# Patient Record
Sex: MALE | Race: WHITE | NOT HISPANIC OR LATINO | Employment: FULL TIME | URBAN - METROPOLITAN AREA
[De-identification: names, ages, dates, MRNs, and addresses within clinical notes are randomized per-mention and may not be internally consistent; named-entity substitution may affect disease eponyms.]

---

## 2017-01-26 ENCOUNTER — TELEPHONE (OUTPATIENT)
Dept: ORTHOPEDICS | Facility: CLINIC | Age: 37
End: 2017-01-26

## 2017-01-26 NOTE — PROGRESS NOTES
[facility] [place]  [address], [address]  [place] [address]  [phone]  Dept: [phone]    PRE-OP EVALUATION:  Today's date: 2017    Steve Vo (: 1980) presents for pre-operative evaluation assessment as requested by Dr. Gandara.  He requires evaluation and anesthesia risk assessment prior to undergoing surgery/procedure for treatment of left knee pain.  Proposed procedure: Left knee arthroscopy    Date of Surgery/ Procedure: 2017  Time of Surgery/ Procedure: 10:30am  Hospital/Surgical Facility: [facility]  Fax number for surgical facility:   Primary Physician: Rowan Martini  Type of Anesthesia Anticipated: General     Patient has a Health Care Directive or Living Will:  NO    1. NO - Do you have a history of heart attack, stroke, stent, bypass or surgery on an artery in the head, neck, heart or legs?  2. NO - Do you ever have any pain or discomfort in your chest?  3. NO - Do you have a history of  Heart Failure?  4. NO - Are you troubled by shortness of breath when: walking on the level, up a slight hill or at night?  5. YES - DO YOU CURRENTLY HAVE A COLD, BRONCHITIS OR OTHER RESPIRATORY INFECTION? Dealing with bronchitis  6. YES - DO YOU HAVE A COUGH, SHORTNESS OF BREATH OR WHEEZING? Mild cough- dry, and occasional   7. NO - Do you sometimes get pains in the calves of your legs when you walk?  9. NO - DO YOU OR DOES ANYONE IN YOUR FAMILY HAVE A SERIOUS BLEEDING PROBLEM SUCH AS PROLONGED BLEEDING FOLLOWING SURGERIES OR CUTS?   9. YES - Do you or does anyone in your family have a serious bleeding problem such as prolonged bleeding following surgeries or cuts? Father DVT- smoking  10. NO - Have you ever had problems with anemia or been told to take iron pills?  11. NO - Have you had any abnormal blood loss such as black, tarry or bloody stools, or abnormal vaginal bleeding?  12. NO - Have you ever had a blood transfusion?  13. NO - Have you or any  of your relatives ever had problems with anesthesia?  14. YES - DO YOU HAVE SLEEP APNEA, EXCESSIVE SNORING OR DAYTIME DROWSINESS? snoring  15. NO - Do you have any prosthetic heart valves?  16. NO - Do you have prosthetic joints?  17. NO - Is there any chance that you may be pregnant?      HPI:                                                      Brief HPI related to upcoming procedure: HX of chondromalacia patella, and Meniscus tear right knee, needing surgical repair.   Current treatment for bronchitis.   Patient has current dry cough- mild. Mild nasal congestion and sore throat, without fever. Has been present since mid December, 2016. Symptoms stable. No exertional symptoms or CONDE.       See problem list for active medical problems.  Problems all longstanding and stable, except as noted/documented.  See ROS for pertinent symptoms related to these conditions.                                                                                                  .    MEDICAL HISTORY:                                                      Patient Active Problem List    Diagnosis Date Noted     Bronchitis 01/30/2017     Priority: Medium     Electronic cigarette use 01/30/2017     Priority: Medium     Snoring 01/30/2017     Priority: Medium     LLQ abdominal pain 01/30/2017     Priority: Medium     Complex tear of medial meniscus of left knee as current injury 10/27/2016     Priority: Medium     Chondromalacia, knee, left 10/27/2016     Priority: Medium     CARDIOVASCULAR SCREENING; LDL GOAL LESS THAN 160 02/21/2013     Priority: Medium      Past Medical History   Diagnosis Date     NO ACTIVE PROBLEMS      Past Surgical History   Procedure Laterality Date     Biopsy of skin lesion  1990     inner lip cyst     Appendectomy  03/12/14     Vasectomy  2014     Current Outpatient Prescriptions   Medication Sig Dispense Refill     loratadine (CLARITIN) 10 MG tablet Take 1 tablet (10 mg) by mouth daily 30 tablet 1     albuterol  "(ALBUTEROL) 108 (90 BASE) MCG/ACT Inhaler Inhale 2 puffs into the lungs 4 times daily as needed for shortness of breath / dyspnea or wheezing 2 Inhaler 1     azelastine (ASTELIN) 0.1 % spray Spray 1-2 sprays into both nostrils 2 times daily 1 Bottle 1     OTC products: no recent use of OTC ASA, NSAIDS or Steroids    Allergies   Allergen Reactions     Penicillins Rash      Latex Allergy: NO    Social History   Substance Use Topics     Smoking status: Current Every Day Smoker -- 0.50 packs/day for 15 years     Types: Other     Last Attempt to Quit: 03/20/2014     Smokeless tobacco: Never Used      Comment: ecig daily     Alcohol Use: 0.0 oz/week     0 Standard drinks or equivalent per week      Comment: socially     History   Drug Use No       REVIEW OF SYSTEMS:                                                    Constitutional, neuro, ENT, endocrine, pulmonary, cardiac, gastrointestinal, genitourinary, musculoskeletal, integument and psychiatric systems are negative, except as otherwise noted. Bowel movements daily, has occasional LLQ abdominal pain- self-limiting, possible diverticulosis. No nausea or vomiting, and PO is no affected.     EXAM:                                                    /78 mmHg  Pulse 80  Temp(Src) 98.7  F (37.1  C) (Temporal)  Resp 14  Ht 5' 11\" (1.803 m)  Wt 234 lb 8 oz (106.369 kg)  BMI 32.72 kg/m2    GENERAL APPEARANCE: healthy, alert and no distress     EYES: EOMI, - PERRL     HENT: ear canals and TM's normal and nose and mouth without ulcers or lesions, mild erythema in oropharynx, uvula is elongated.      NECK: no adenopathy, no asymmetry, masses, or scars and thyroid normal to palpation     RESP: lungs clear to auscultation - no rales, rhonchi or wheezes     CV: regular rates and rhythm, normal S1 S2, no S3 or S4 and no murmur, click or rub -     ABDOMEN:  soft, nontender, no HSM or masses and bowel sounds normal     MS: extremities normal- no gross deformities noted, no " evidence of inflammation in joints, FROM in all extremities.     SKIN: no suspicious lesions or rashes     NEURO: Normal strength and tone, sensory exam grossly normal, mentation intact and speech normal     PSYCH: mentation appears normal. and affect normal/bright    DIAGNOSTICS:                                                    Hemoglobin (indicated for history of anemia or procedure with significant blood loss such as tonsillectomy, major intraperitoneal surgery, vascular surgery, major spine surgery, total joint replacement)    Recent Labs   Lab Test 03/12/14 09/05/13   1027   HGB   --   14.7   PLT   --   308   POTASSIUM  4.0   --    CR  0.89   --         IMPRESSION:                                                    Reason for surgery/procedure: left knee meniscus tear ad chondromalacia patella  Diagnosis/reason for consult: pre-op clearance  Current bronchitis  Snoring  LLL abdominal pain- not current.     The proposed surgical procedure is considered INTERMEDIATE risk.    REVISED CARDIAC RISK INDEX  The patient has the following serious cardiovascular risks for perioperative complications such as (MI, PE, VFib and 3  AV Block):  No serious cardiac risks  INTERPRETATION: 0 risks: Class I (very low risk - 0.4% complication rate)    The patient has the following additional risks for perioperative complications:  No identified additional risks      ICD-10-CM    1. Preop general physical exam Z01.818 Hemoglobin   2. Complex tear of medial meniscus of left knee as current injury, subsequent encounter S83.232D Hemoglobin   3. Chondromalacia, knee, left M94.262 Hemoglobin   4. Bronchitis J40        RECOMMENDATIONS:                                                          --Patient is to take all scheduled medications on the day of surgery EXCEPT for modifications listed below.    Hospital staff advised to monitor for oxygen de-saturation, due to snoring history.     APPROVAL GIVEN to proceed with proposed  procedure, without further diagnostic evaluation    Pt. Is monitoring bronchitis symptoms. Currently mild and may proceed with surgery. If symptoms worsen or change, he is to contact us and the surgeon.     Pt. Advised to follow-up with PCP post-op regarding LLQ abdominal pain- sub- acute, eval. and colonoscopy recommendations.        Signed Electronically by: QUIN Terrell CNP    Copy of this evaluation report is provided to requesting physician.    [facility] Preop Guidelines

## 2017-01-30 ENCOUNTER — OFFICE VISIT (OUTPATIENT)
Dept: FAMILY MEDICINE | Facility: CLINIC | Age: 37
End: 2017-01-30
Payer: COMMERCIAL

## 2017-01-30 VITALS
HEIGHT: 71 IN | BODY MASS INDEX: 32.83 KG/M2 | WEIGHT: 234.5 LBS | SYSTOLIC BLOOD PRESSURE: 118 MMHG | RESPIRATION RATE: 14 BRPM | DIASTOLIC BLOOD PRESSURE: 78 MMHG | HEART RATE: 80 BPM | TEMPERATURE: 98.7 F

## 2017-01-30 DIAGNOSIS — J40 BRONCHITIS: ICD-10-CM

## 2017-01-30 DIAGNOSIS — S83.232D COMPLEX TEAR OF MEDIAL MENISCUS OF LEFT KNEE AS CURRENT INJURY, SUBSEQUENT ENCOUNTER: ICD-10-CM

## 2017-01-30 DIAGNOSIS — M94.262 CHONDROMALACIA, KNEE, LEFT: ICD-10-CM

## 2017-01-30 DIAGNOSIS — Z01.818 PREOP GENERAL PHYSICAL EXAM: Primary | ICD-10-CM

## 2017-01-30 PROBLEM — R10.32 LLQ ABDOMINAL PAIN: Status: ACTIVE | Noted: 2017-01-30

## 2017-01-30 PROBLEM — R06.83 SNORING: Status: ACTIVE | Noted: 2017-01-30

## 2017-01-30 PROBLEM — Z78.9 ELECTRONIC CIGARETTE USE: Status: ACTIVE | Noted: 2017-01-30

## 2017-01-30 LAB — HGB BLD-MCNC: 14.7 G/DL (ref 13.3–17.7)

## 2017-01-30 PROCEDURE — 99214 OFFICE O/P EST MOD 30 MIN: CPT | Performed by: NURSE PRACTITIONER

## 2017-01-30 PROCEDURE — 36415 COLL VENOUS BLD VENIPUNCTURE: CPT | Performed by: NURSE PRACTITIONER

## 2017-01-30 PROCEDURE — 85018 HEMOGLOBIN: CPT | Performed by: NURSE PRACTITIONER

## 2017-01-30 RX ORDER — ALBUTEROL SULFATE 90 UG/1
2 AEROSOL, METERED RESPIRATORY (INHALATION) 4 TIMES DAILY PRN
Qty: 2 INHALER | Refills: 1 | COMMUNITY
Start: 2017-01-30 | End: 2017-05-23

## 2017-01-30 RX ORDER — AZELASTINE 1 MG/ML
1-2 SPRAY, METERED NASAL 2 TIMES DAILY
Qty: 1 BOTTLE | Refills: 1 | COMMUNITY
Start: 2017-01-30 | End: 2017-05-23

## 2017-01-30 RX ORDER — LORATADINE 10 MG/1
10 TABLET ORAL DAILY
Qty: 30 TABLET | Refills: 1 | COMMUNITY
Start: 2017-01-30 | End: 2022-02-18 | Stop reason: ALTCHOICE

## 2017-01-30 ASSESSMENT — PAIN SCALES - GENERAL: PAINLEVEL: MILD PAIN (2)

## 2017-01-30 ASSESSMENT — ANXIETY QUESTIONNAIRES
7. FEELING AFRAID AS IF SOMETHING AWFUL MIGHT HAPPEN: NOT AT ALL
1. FEELING NERVOUS, ANXIOUS, OR ON EDGE: SEVERAL DAYS
6. BECOMING EASILY ANNOYED OR IRRITABLE: SEVERAL DAYS
IF YOU CHECKED OFF ANY PROBLEMS ON THIS QUESTIONNAIRE, HOW DIFFICULT HAVE THESE PROBLEMS MADE IT FOR YOU TO DO YOUR WORK, TAKE CARE OF THINGS AT HOME, OR GET ALONG WITH OTHER PEOPLE: NOT DIFFICULT AT ALL
GAD7 TOTAL SCORE: 4
3. WORRYING TOO MUCH ABOUT DIFFERENT THINGS: SEVERAL DAYS
5. BEING SO RESTLESS THAT IT IS HARD TO SIT STILL: NOT AT ALL
2. NOT BEING ABLE TO STOP OR CONTROL WORRYING: SEVERAL DAYS

## 2017-01-30 ASSESSMENT — PATIENT HEALTH QUESTIONNAIRE - PHQ9: 5. POOR APPETITE OR OVEREATING: NOT AT ALL

## 2017-01-30 NOTE — NURSING NOTE
"Chief Complaint   Patient presents with     Pre-Op Exam     left knee surgery     Panel Management     Flu shot, phq, jennifer, Urine drug screen       Initial /78 mmHg  Pulse 80  Temp(Src) 98.7  F (37.1  C) (Temporal)  Resp 14  Ht 5' 11\" (1.803 m)  Wt 234 lb 8 oz (106.369 kg)  BMI 32.72 kg/m2 Estimated body mass index is 32.72 kg/(m^2) as calculated from the following:    Height as of this encounter: 5' 11\" (1.803 m).    Weight as of this encounter: 234 lb 8 oz (106.369 kg).  BP completed using cuff size: SMA Litzy    "

## 2017-01-31 ASSESSMENT — ANXIETY QUESTIONNAIRES: GAD7 TOTAL SCORE: 4

## 2017-01-31 ASSESSMENT — PATIENT HEALTH QUESTIONNAIRE - PHQ9: SUM OF ALL RESPONSES TO PHQ QUESTIONS 1-9: 4

## 2017-02-03 ENCOUNTER — ANESTHESIA (OUTPATIENT)
Dept: SURGERY | Facility: AMBULATORY SURGERY CENTER | Age: 37
End: 2017-02-03
Payer: COMMERCIAL

## 2017-02-03 ENCOUNTER — ANESTHESIA EVENT (OUTPATIENT)
Dept: SURGERY | Facility: AMBULATORY SURGERY CENTER | Age: 37
End: 2017-02-03
Payer: COMMERCIAL

## 2017-02-03 ENCOUNTER — HOSPITAL ENCOUNTER (OUTPATIENT)
Facility: AMBULATORY SURGERY CENTER | Age: 37
Discharge: HOME | End: 2017-02-03
Attending: ORTHOPAEDIC SURGERY | Admitting: ORTHOPAEDIC SURGERY
Payer: COMMERCIAL

## 2017-02-03 VITALS
SYSTOLIC BLOOD PRESSURE: 118 MMHG | OXYGEN SATURATION: 97 % | RESPIRATION RATE: 20 BRPM | TEMPERATURE: 96.8 F | DIASTOLIC BLOOD PRESSURE: 75 MMHG

## 2017-02-03 DIAGNOSIS — S83.232D COMPLEX TEAR OF MEDIAL MENISCUS OF LEFT KNEE AS CURRENT INJURY, SUBSEQUENT ENCOUNTER: Primary | ICD-10-CM

## 2017-02-03 DIAGNOSIS — M94.262 CHONDROMALACIA, KNEE, LEFT: ICD-10-CM

## 2017-02-03 PROCEDURE — 29881 ARTHRS KNE SRG MNISECTMY M/L: CPT | Mod: LT | Performed by: ORTHOPAEDIC SURGERY

## 2017-02-03 PROCEDURE — G8916 PT W IV AB GIVEN ON TIME: HCPCS

## 2017-02-03 PROCEDURE — 29881 ARTHRS KNE SRG MNISECTMY M/L: CPT | Mod: LT

## 2017-02-03 PROCEDURE — G8907 PT DOC NO EVENTS ON DISCHARG: HCPCS

## 2017-02-03 RX ORDER — SODIUM CHLORIDE, SODIUM LACTATE, POTASSIUM CHLORIDE, CALCIUM CHLORIDE 600; 310; 30; 20 MG/100ML; MG/100ML; MG/100ML; MG/100ML
INJECTION, SOLUTION INTRAVENOUS CONTINUOUS
Status: DISCONTINUED | OUTPATIENT
Start: 2017-02-03 | End: 2017-02-03 | Stop reason: HOSPADM

## 2017-02-03 RX ORDER — CLINDAMYCIN PHOSPHATE 900 MG/50ML
900 INJECTION, SOLUTION INTRAVENOUS
Status: COMPLETED | OUTPATIENT
Start: 2017-02-03 | End: 2017-02-03

## 2017-02-03 RX ORDER — ONDANSETRON 2 MG/ML
4 INJECTION INTRAMUSCULAR; INTRAVENOUS EVERY 30 MIN PRN
Status: DISCONTINUED | OUTPATIENT
Start: 2017-02-03 | End: 2017-02-03 | Stop reason: HOSPADM

## 2017-02-03 RX ORDER — ONDANSETRON 4 MG/1
4 TABLET, ORALLY DISINTEGRATING ORAL EVERY 30 MIN PRN
Status: DISCONTINUED | OUTPATIENT
Start: 2017-02-03 | End: 2017-02-03 | Stop reason: HOSPADM

## 2017-02-03 RX ORDER — ACETAMINOPHEN 325 MG/1
975 TABLET ORAL ONCE
Status: COMPLETED | OUTPATIENT
Start: 2017-02-03 | End: 2017-02-03

## 2017-02-03 RX ORDER — CLINDAMYCIN PHOSPHATE 900 MG/50ML
900 INJECTION, SOLUTION INTRAVENOUS SEE ADMIN INSTRUCTIONS
Status: DISCONTINUED | OUTPATIENT
Start: 2017-02-03 | End: 2017-02-03 | Stop reason: HOSPADM

## 2017-02-03 RX ORDER — LIDOCAINE 40 MG/G
CREAM TOPICAL
Status: DISCONTINUED | OUTPATIENT
Start: 2017-02-03 | End: 2017-02-03 | Stop reason: HOSPADM

## 2017-02-03 RX ORDER — DEXAMETHASONE SODIUM PHOSPHATE 4 MG/ML
INJECTION, SOLUTION INTRA-ARTICULAR; INTRALESIONAL; INTRAMUSCULAR; INTRAVENOUS; SOFT TISSUE PRN
Status: DISCONTINUED | OUTPATIENT
Start: 2017-02-03 | End: 2017-02-03

## 2017-02-03 RX ORDER — KETOROLAC TROMETHAMINE 30 MG/ML
INJECTION, SOLUTION INTRAMUSCULAR; INTRAVENOUS PRN
Status: DISCONTINUED | OUTPATIENT
Start: 2017-02-03 | End: 2017-02-03

## 2017-02-03 RX ORDER — NALOXONE HYDROCHLORIDE 0.4 MG/ML
.1-.4 INJECTION, SOLUTION INTRAMUSCULAR; INTRAVENOUS; SUBCUTANEOUS
Status: DISCONTINUED | OUTPATIENT
Start: 2017-02-03 | End: 2017-02-03 | Stop reason: HOSPADM

## 2017-02-03 RX ORDER — ACETAMINOPHEN 325 MG/1
975 TABLET ORAL ONCE
Status: DISCONTINUED | OUTPATIENT
Start: 2017-02-03 | End: 2017-02-03 | Stop reason: HOSPADM

## 2017-02-03 RX ORDER — OXYCODONE AND ACETAMINOPHEN 5; 325 MG/1; MG/1
1-2 TABLET ORAL EVERY 4 HOURS PRN
Qty: 30 TABLET | Refills: 0 | Status: SHIPPED | OUTPATIENT
Start: 2017-02-03 | End: 2017-05-25

## 2017-02-03 RX ORDER — BUPIVACAINE HYDROCHLORIDE AND EPINEPHRINE 2.5; 5 MG/ML; UG/ML
INJECTION, SOLUTION INFILTRATION; PERINEURAL PRN
Status: DISCONTINUED | OUTPATIENT
Start: 2017-02-03 | End: 2017-02-03 | Stop reason: HOSPADM

## 2017-02-03 RX ORDER — FENTANYL CITRATE 50 UG/ML
25-50 INJECTION, SOLUTION INTRAMUSCULAR; INTRAVENOUS
Status: DISCONTINUED | OUTPATIENT
Start: 2017-02-03 | End: 2017-02-03 | Stop reason: HOSPADM

## 2017-02-03 RX ORDER — MEPERIDINE HYDROCHLORIDE 25 MG/ML
12.5 INJECTION INTRAMUSCULAR; INTRAVENOUS; SUBCUTANEOUS
Status: DISCONTINUED | OUTPATIENT
Start: 2017-02-03 | End: 2017-02-03 | Stop reason: HOSPADM

## 2017-02-03 RX ORDER — GABAPENTIN 300 MG/1
300 CAPSULE ORAL ONCE
Status: DISCONTINUED | OUTPATIENT
Start: 2017-02-03 | End: 2017-02-03 | Stop reason: HOSPADM

## 2017-02-03 RX ORDER — HYDROMORPHONE HYDROCHLORIDE 1 MG/ML
.3-.5 INJECTION, SOLUTION INTRAMUSCULAR; INTRAVENOUS; SUBCUTANEOUS EVERY 10 MIN PRN
Status: DISCONTINUED | OUTPATIENT
Start: 2017-02-03 | End: 2017-02-03 | Stop reason: HOSPADM

## 2017-02-03 RX ORDER — LIDOCAINE HYDROCHLORIDE 20 MG/ML
INJECTION, SOLUTION INFILTRATION; PERINEURAL PRN
Status: DISCONTINUED | OUTPATIENT
Start: 2017-02-03 | End: 2017-02-03

## 2017-02-03 RX ORDER — FENTANYL CITRATE 50 UG/ML
INJECTION, SOLUTION INTRAMUSCULAR; INTRAVENOUS PRN
Status: DISCONTINUED | OUTPATIENT
Start: 2017-02-03 | End: 2017-02-03

## 2017-02-03 RX ORDER — PROPOFOL 10 MG/ML
INJECTION, EMULSION INTRAVENOUS CONTINUOUS PRN
Status: DISCONTINUED | OUTPATIENT
Start: 2017-02-03 | End: 2017-02-03

## 2017-02-03 RX ORDER — SODIUM CHLORIDE, SODIUM LACTATE, POTASSIUM CHLORIDE, CALCIUM CHLORIDE 600; 310; 30; 20 MG/100ML; MG/100ML; MG/100ML; MG/100ML
INJECTION, SOLUTION INTRAVENOUS CONTINUOUS PRN
Status: DISCONTINUED | OUTPATIENT
Start: 2017-02-03 | End: 2017-02-03

## 2017-02-03 RX ORDER — ONDANSETRON 2 MG/ML
INJECTION INTRAMUSCULAR; INTRAVENOUS PRN
Status: DISCONTINUED | OUTPATIENT
Start: 2017-02-03 | End: 2017-02-03

## 2017-02-03 RX ORDER — GABAPENTIN 300 MG/1
300 CAPSULE ORAL ONCE
Status: COMPLETED | OUTPATIENT
Start: 2017-02-03 | End: 2017-02-03

## 2017-02-03 RX ORDER — PROPOFOL 10 MG/ML
INJECTION, EMULSION INTRAVENOUS PRN
Status: DISCONTINUED | OUTPATIENT
Start: 2017-02-03 | End: 2017-02-03

## 2017-02-03 RX ADMIN — FENTANYL CITRATE 50 MCG: 50 INJECTION, SOLUTION INTRAMUSCULAR; INTRAVENOUS at 11:29

## 2017-02-03 RX ADMIN — FENTANYL CITRATE 50 MCG: 50 INJECTION, SOLUTION INTRAMUSCULAR; INTRAVENOUS at 11:10

## 2017-02-03 RX ADMIN — ACETAMINOPHEN 975 MG: 325 TABLET ORAL at 10:19

## 2017-02-03 RX ADMIN — CLINDAMYCIN PHOSPHATE 900 MG: 900 INJECTION, SOLUTION INTRAVENOUS at 11:15

## 2017-02-03 RX ADMIN — KETOROLAC TROMETHAMINE 30 MG: 30 INJECTION, SOLUTION INTRAMUSCULAR; INTRAVENOUS at 11:16

## 2017-02-03 RX ADMIN — SODIUM CHLORIDE, SODIUM LACTATE, POTASSIUM CHLORIDE, CALCIUM CHLORIDE: 600; 310; 30; 20 INJECTION, SOLUTION INTRAVENOUS at 10:41

## 2017-02-03 RX ADMIN — ONDANSETRON 4 MG: 2 INJECTION INTRAMUSCULAR; INTRAVENOUS at 11:16

## 2017-02-03 RX ADMIN — LIDOCAINE HYDROCHLORIDE 40 MG: 20 INJECTION, SOLUTION INFILTRATION; PERINEURAL at 11:10

## 2017-02-03 RX ADMIN — PROPOFOL 200 MG: 10 INJECTION, EMULSION INTRAVENOUS at 11:10

## 2017-02-03 RX ADMIN — DEXAMETHASONE SODIUM PHOSPHATE 4 MG: 4 INJECTION, SOLUTION INTRA-ARTICULAR; INTRALESIONAL; INTRAMUSCULAR; INTRAVENOUS; SOFT TISSUE at 11:16

## 2017-02-03 RX ADMIN — GABAPENTIN 300 MG: 300 CAPSULE ORAL at 10:19

## 2017-02-03 RX ADMIN — PROPOFOL 200 MCG/KG/MIN: 10 INJECTION, EMULSION INTRAVENOUS at 11:10

## 2017-02-03 NOTE — ANESTHESIA PREPROCEDURE EVALUATION
Anesthesia Evaluation     .        ROS/MED HX    ENT/Pulmonary:  - neg pulmonary ROS     Neurologic:       Cardiovascular:  - neg cardiovascular ROS       METS/Exercise Tolerance:     Hematologic:         Musculoskeletal:         GI/Hepatic:         Renal/Genitourinary:         Endo:         Psychiatric:         Infectious Disease:         Malignancy:         Other:               Physical Exam  Normal systems: pulmonary and dental    Airway   Mallampati: II  TM distance: >3 FB  Neck ROM: full    Dental     Cardiovascular   Rhythm and rate: regular and normal      Pulmonary                     Anesthesia Plan      History & Physical Review  History and physical reviewed and following examination; no interval change.    ASA Status:  2 .    NPO Status:  > 8 hours    Plan for General and LMA with Intravenous and Propofol induction. Maintenance will be TIVA.    PONV prophylaxis:  Ondansetron (or other 5HT-3) and Dexamethasone or Solumedrol       Postoperative Care  Postoperative pain management:  IV analgesics and Multi-modal analgesia.      Consents  Anesthetic plan, risks, benefits and alternatives discussed with:  Patient..                          .

## 2017-02-03 NOTE — ANESTHESIA POSTPROCEDURE EVALUATION
Patient: Steve Hammondslin    Procedure(s):  Left knee arthroscopy, medial meniscectomy, general anesthesia - Wound Class: I-Clean    Diagnosis:left knee medial meniscus tear  Diagnosis Additional Information: No value filed.    Anesthesia Type:  General, LMA    Note:  Anesthesia Post Evaluation    Patient location during evaluation: PACU  Patient participation: Able to fully participate in evaluation  Level of consciousness: awake  Pain management: adequate  Airway patency: patent  Cardiovascular status: acceptable  Respiratory status: acceptable  Hydration status: balanced  PONV: none     Anesthetic complications: None          Last vitals:  Filed Vitals:    02/03/17 1200 02/03/17 1205 02/03/17 1210   BP: 102/59 104/72    Temp: 97  F (36.1  C)     Resp: 24 20 18   SpO2: 94% 98% 99%         Electronically Signed By: Amrit Clark MD  February 3, 2017  12:12 PM

## 2017-02-03 NOTE — ANESTHESIA CARE TRANSFER NOTE
Patient: Steve Tavo    Procedure(s):  Left knee arthroscopy, medial meniscectomy, general anesthesia - Wound Class: I-Clean    Diagnosis: left knee medial meniscus tear  Diagnosis Additional Information: No value filed.    Anesthesia Type:   General, LMA     Note:  Airway :Nasal Cannula  Patient transferred to:PACU        Vitals: (Last set prior to Anesthesia Care Transfer)    CRNA VITALS  2/3/2017 1128 - 2/3/2017 1203      2/3/2017             Pulse: 79    SpO2: 99 %    Resp Rate (observed): 14                Electronically Signed By: QUIN Li CRNA  February 3, 2017  12:03 PM

## 2017-02-03 NOTE — IP AVS SNAPSHOT
[facility]    [address] [place] [address]    Phone:  [phone]                                       After Visit Summary   2/3/2017    Steve Vo    MRN: [id number]           After Visit Summary Signature Page     I have received my discharge instructions, and my questions have been answered. I have discussed any challenges I see with this plan with the nurse or doctor.    ..........................................................................................................................................  Patient/Patient Representative Signature      ..........................................................................................................................................  Patient Representative Print Name and Relationship to Patient    ..................................................               ................................................  Date                                            Time    ..........................................................................................................................................  Reviewed by Signature/Title    ...................................................              ..............................................  Date                                                            Time

## 2017-02-03 NOTE — IP AVS SNAPSHOT
MRN:[id number]                      After Visit Summary   2/3/2017    Steve Vo    MRN: [id number]           Thank you!     Thank you for choosing [facility] for your care. Our goal is always to provide you with excellent care. Hearing back from our patients is one way we can continue to improve our services. Please take a few minutes to complete the written survey that you may receive in the mail after you visit with us. Thank you!        Patient Information     Date Of Birth          1980        About your hospital stay     You were admitted on:  February 3, 2017 [place]  [facility]    You were discharged on:  February 3, 2017       Who to Call     For medical emergencies, please call 911.  For non-urgent questions about your medical care, please call your primary care provider or clinic, [phone]  For questions related to your surgery, please call your surgery clinic        Attending Provider     Provider    Mandeep Gandara MD       Primary Care Provider Office Phone # Fax #    RowanQUIN Starks [facility] [phone]       [facility] [address] [address] [address]        After Care Instructions     Discharge Instructions       Review outpatient procedure discharge instructions with patient as directed by Provider  You may walk on the leg as soon as you wish.  Bend the knee as soon as you wish.    Most people use crutches for a couple days.  Leave initial dressing for 2 days.  Then you may remove dressing and shower.  Rewrap with ace wrap.  Leave steri-strips in place.  Weight bearing as tolerated.  Apply ice or cold packs intermittently as needed to relieve pain.   Take pain medications as much as needed, but as little as you can get away with.  They do cause constipation.  May use Aleve up to 4 tablets per day or ibuprofen up to 12 tablets per day if desired.  Aspirin - Take 1 tablet (325 mg) by mouth daily  for blood thinning for 2-3 weeks.  Most people take about a week off work.  Return to clinic 1 week after surgery.                  Your next 10 appointments already scheduled     2017  8:00 AM   Return Visit with Mandeep Gandara MD   [facility] [place] ([facility] [place])    [address]  [address]  [place] [address]   [phone]              Further instructions from your care team       [facility]  Same-Day Surgery   Adult Discharge Orders & Instructions   For 24 hours after surgery  1. Get plenty of rest.  A responsible adult must stay with you for at least 24 hours after you leave the hospital.   2. Do not drive or use heavy equipment.  If you have weakness or tingling, don't drive or use heavy equipment until this feeling goes away.  3. Do not drink alcohol.  4. Avoid strenuous or risky activities.  Ask for help when climbing stairs.   5. You may feel lightheaded.  IF so, sit for a few minutes before standing.  Have someone help you get up.   6. If you have nausea (feel sick to your stomach): Drink only clear liquids such as apple juice, ginger ale, broth or 7-Up.  Rest may also help.  Be sure to drink enough fluids.  Move to a regular diet as you feel able.  7. You may have a slight fever. Call the doctor if your fever is over 100 F (37.7 C) (taken under the tongue) or lasts longer than 24 hours.  8. You may have a dry mouth, a sore throat, muscle aches or trouble sleeping.  These should go away after 24 hours.  9. Do not make important or legal decisions.   Call your doctor for any of the followin.  Signs of infection (fever, growing tenderness at the surgery site, a large amount of drainage or bleeding, severe pain, foul-smelling drainage, redness, swelling).    2. It has been over 8 to 10 hours since surgery and you are still not able to urinate (pass water).    3.  Headache for over 24 hours.      To contact a doctor, call  [phone]___________________________       Tylenol was given at 10:20 am.    No Ibuprofen before 5:15 pm.      Pending Results     No orders found from 2/2/2017 to 2/4/2017.            Admission Information        Provider Department Dept Phone    2/3/2017 Mandeep Gandara MD Mg Preop/Phase [phone]      Your Vitals Were     Blood Pressure Temperature Respirations Pulse Oximetry          104/72 mmHg 97  F (36.1  C) (Temporal) 20 98%        MyChart Information     [facility] gives you secure access to your electronic health record. If you see a primary care provider, you can also send messages to your care team and make appointments. If you have questions, please call your primary care clinic.  If you do not have a primary care provider, please call [phone] and they will assist you.        Care EveryWhere ID     This is your Care EveryWhere ID. This could be used by other organizations to access your [facility] medical records  [id number]           Review of your medicines      START taking        Dose / Directions    oxyCODONE-acetaminophen 5-325 MG per tablet   Commonly known as:  PERCOCET   Used for:  Complex tear of medial meniscus of left knee as current injury, subsequent encounter, Chondromalacia, knee, left        Dose:  1-2 tablet   Take 1-2 tablets by mouth every 4 hours as needed for pain (moderate to severe)   Quantity:  30 tablet   Refills:  0         CONTINUE these medicines which have NOT CHANGED        Dose / Directions    albuterol 108 (90 BASE) MCG/ACT Inhaler   Generic drug:  albuterol        Dose:  2 puff   Inhale 2 puffs into the lungs 4 times daily as needed for shortness of breath / dyspnea or wheezing   Quantity:  2 Inhaler   Refills:  1       ASTELIN 0.1 % spray   Generic drug:  azelastine        Dose:  1-2 spray   Spray 1-2 sprays into both nostrils 2 times daily   Quantity:  1 Bottle   Refills:  1       CLARITIN 10 MG tablet   Generic drug:  loratadine        Dose:  10 mg    Take 1 tablet (10 mg) by mouth daily   Quantity:  30 tablet   Refills:  1       IBUPROFEN PO        Dose:  200 mg   Take 200 mg by mouth as needed for moderate pain   Refills:  0            Where to get your medicines      Some of these will need a paper prescription and others can be bought over the counter. Ask your nurse if you have questions.     Bring a paper prescription for each of these medications    - oxyCODONE-acetaminophen 5-325 MG per tablet             Protect others around you: Learn how to safely use, store and throw away your medicines at www.disposemymeds.org.             Medication List: This is a list of all your medications and when to take them. Check marks below indicate your daily home schedule. Keep this list as a reference.      Medications           Morning Afternoon Evening Bedtime As Needed    albuterol 108 (90 BASE) MCG/ACT Inhaler   Inhale 2 puffs into the lungs 4 times daily as needed for shortness of breath / dyspnea or wheezing   Generic drug:  albuterol                                ASTELIN 0.1 % spray   Spray 1-2 sprays into both nostrils 2 times daily   Generic drug:  azelastine                                CLARITIN 10 MG tablet   Take 1 tablet (10 mg) by mouth daily   Generic drug:  loratadine                                IBUPROFEN PO   Take 200 mg by mouth as needed for moderate pain                                oxyCODONE-acetaminophen 5-325 MG per tablet   Commonly known as:  PERCOCET   Take 1-2 tablets by mouth every 4 hours as needed for pain (moderate to severe)

## 2017-02-03 NOTE — BRIEF OP NOTE
PROCEDURE NOTE:    Pre-operative diagnosis: left knee medial meniscus tear   Post-operative diagnosis: Left knee medial meniscus tear   Procedure: Procedure(s):  Left knee arthroscopy with partial medial meniscectomy   Surgeon: Mandeep Gandara MD   Assistant(s): Venkatesh Hemphill PA-C   Estimated blood loss: Minimal   Specimens: None   Findings: Left knee medial meniscus tear   Anesthesia: General endotracheal anesthesia   Drains: None   Complications: None   Weight bearing status: Weight bearing as tolerated   Activity: Activity as tolerated  Patient may move about with assist as indicated or with supervision     Venkatesh Hemphill PA-C  Supervising physician: Mandeep Gandara MD  Dept. of Orthopedics  [facility]

## 2017-02-09 ENCOUNTER — OFFICE VISIT (OUTPATIENT)
Dept: ORTHOPEDICS | Facility: CLINIC | Age: 37
End: 2017-02-09
Payer: COMMERCIAL

## 2017-02-09 VITALS — WEIGHT: 232 LBS | BODY MASS INDEX: 32.48 KG/M2 | RESPIRATION RATE: 18 BRPM | HEIGHT: 71 IN

## 2017-02-09 DIAGNOSIS — S83.232D COMPLEX TEAR OF MEDIAL MENISCUS OF LEFT KNEE AS CURRENT INJURY, SUBSEQUENT ENCOUNTER: ICD-10-CM

## 2017-02-09 DIAGNOSIS — Z98.890 S/P ARTHROSCOPY OF LEFT KNEE: Primary | ICD-10-CM

## 2017-02-09 PROCEDURE — 99024 POSTOP FOLLOW-UP VISIT: CPT | Performed by: ORTHOPAEDIC SURGERY

## 2017-02-09 NOTE — PROGRESS NOTES
Follow-up left knee arthroscopy with partial medial meniscectomy on .   Wounds are healing well.    He has small effusion.   There is mild pain.      Assessment:  Left knee arthroscopy with partial medial meniscectomy  doing well.  I reviewed the operative findings.  Will start range of motion and strengthening.  Scar massage.  Advance activity as tolerated.  Pain medication refill:  None.  RTC 3-4 weeks if still painful.

## 2017-02-09 NOTE — PATIENT INSTRUCTIONS
After arthroscopy we want to get range of motion and strength back.  Riding a bicycle at low resistance is helpful for motion and strength.  Tubigrip or ace wrap will help decrease swelling.  .   Standing hamstring stretch: Put the heel of the leg on your injured side on a stool about 15 inches high. Keep your leg straight. Lean forward, bending at the hips, until you feel a mild stretch in the back of your thigh. Make sure you don't roll your shoulders or bend at the waist when doing this or you will stretch your lower back instead of your leg. Hold the stretch for 15 to 30 seconds. Repeat 3 times.   Quadriceps stretch: Stand an arm's length away from the wall with your injured leg farthest from the wall. Facing straight ahead, brace yourself by keeping one hand against the wall. With your other hand, grasp the ankle of your injured leg and pull your heel toward your buttocks. Don't arch or twist your back. Keep your knees together. Hold this stretch for 15 to 30 seconds.   Side-lying leg lift: Lie on your uninjured side. Tighten the front thigh muscles on your injured leg and lift that leg 8 to 10 inches away from the other leg. Keep the leg straight and lower it slowly. Do 3 sets of 10.   Quad sets: Sit on the floor with your injured leg straight and your other leg bent. Press the back of the knee of your injured leg against the floor by tightening the muscles on the top of your thigh. Hold this position 10 seconds. Relax. Do 3 sets of 10.   Straight leg raise: Lie on your back with your legs straight out in front of you. Bend the knee on your uninjured side and place the foot flat on the floor. Tighten the thigh muscle on your injured side and lift your leg about 8 inches off the floor. Keep your leg straight and your thigh muscle tight. Slowly lower your leg back down to the floor. Do 3 sets of 10.   Step-up: Stand with the foot of your injured leg on a support 3 to 5 inches high (like a small step or block of  wood). Keep your other foot flat on the floor. Shift your weight onto the injured leg on the support. Straighten your injured leg as the other leg comes off the floor. Return to the starting position by bending your injured leg and slowly lowering your uninjured leg back to the floor. Do 3 sets of 10.   Wall squat with a ball: Stand with your back, shoulders, and head against a wall. Look straight ahead. Keep your shoulders relaxed and your feet 3 feet from the wall and shoulder's width apart. Place a soccer or basketball-sized ball behind your back. Keeping your back against the wall, slowly squat down to a 45-degree angle. Your thighs will not yet be parallel to the floor. Hold this position for 10 seconds and then slowly slide back up the wall. Repeat 10 times. Build up to 3 sets of 10.   Knee stabilization: Wrap a piece of elastic tubing around the ankle of the uninjured leg. Tie a knot in the other end of the tubing and close it in a door.   0. Stand facing the door on the leg without tubing and bend your knee slightly, keeping your thigh muscles tight. While maintaining this position, move the leg with the tubing straight back behind you. Do 3 sets of 10.   0. Turn 90 degrees so the leg without tubing is closest to the door. Move the leg with tubing away from your body. Do 3 sets of 10.   0. Turn 90 degrees again so your back is to the door. Move the leg with tubing straight out in front of you. Do 3 sets of 10.   0. Turn your body 90 degrees again so the leg with tubing is closest to the door. Move the leg with tubing across your body. Do 3 sets of 10.   Hold onto a chair if you need help balancing. This exercise can be made even more challenging by standing on a pillow while you move the leg with tubing.   Resisted terminal knee extension: Make a loop from a piece of elastic tubing by tying a knot in both ends. Close both knots in a door. Step into the loop so the tubing is around the back of your injured  leg. Lift the other foot off the ground. Hold onto a chair for balance, if needed. Bend the knee on the leg with tubing about 45 degrees. Slowly straighten your leg, keeping your thigh muscle tight as you do this. Do this 10 times. Do 3 sets. An easier way to do this is to stand on both legs for better support while you do the exercise.   Standing calf stretch: Stand facing a wall with your hands on the wall at about eye level. Keep your injured leg back with your heel on the floor. Keep the other leg forward with the knee bent. Turn your back foot slightly inward (as if you were pigeon-toed). Slowly lean into the wall until you feel a stretch in the back of your calf. Hold the stretch for 15 to 30 seconds. Return to the starting position. Repeat 3 times. Do this exercise several times each day.   Clam exercise: Lie on your uninjured side with your hips and knees bent and feet together. Slowly raise your top leg toward the ceiling while keeping your heels touching each other. Hold for 2 seconds and lower slowly. Do 3 sets of 10 repetitions.   Iliotibial band stretch: Side-bending: Cross one leg in front of the other leg and lean in the opposite direction from the front leg. Reach the arm on the side of the back leg over your head while you do this. Hold this position for 15 to 30 seconds. Return to the starting position. Repeat 3 times and then switch legs and repeat the exercise.   Published by [employer].  This content is reviewed periodically and is subject to change as new health information becomes available. The information is intended to inform and educate and is not a replacement for medical evaluation, advice, diagnosis or treatment by a healthcare professional.   Written by Jayne Ellis, MS, PT, and Magda Daniel, PT, [facility], [facility], for [employer].   ? 2010 [facility] and/or its affiliates. All Rights Reserved.   Copyright   [employer] Systems 2011  Adult Health Advisor

## 2017-02-09 NOTE — MR AVS SNAPSHOT
After Visit Summary   2/9/2017    Steve Vo    MRN: [id number]           Patient Information     Date Of Birth          1980        Visit Information        Provider Department      2/9/2017 8:00 AM Mandeep Gandara MD [facility] Instructions    After arthroscopy we want to get range of motion and strength back.  Riding a bicycle at low resistance is helpful for motion and strength.  Tubigrip or ace wrap will help decrease swelling.  .   Standing hamstring stretch: Put the heel of the leg on your injured side on a stool about 15 inches high. Keep your leg straight. Lean forward, bending at the hips, until you feel a mild stretch in the back of your thigh. Make sure you don't roll your shoulders or bend at the waist when doing this or you will stretch your lower back instead of your leg. Hold the stretch for 15 to 30 seconds. Repeat 3 times.   Quadriceps stretch: Stand an arm's length away from the wall with your injured leg farthest from the wall. Facing straight ahead, brace yourself by keeping one hand against the wall. With your other hand, grasp the ankle of your injured leg and pull your heel toward your buttocks. Don't arch or twist your back. Keep your knees together. Hold this stretch for 15 to 30 seconds.   Side-lying leg lift: Lie on your uninjured side. Tighten the front thigh muscles on your injured leg and lift that leg 8 to 10 inches away from the other leg. Keep the leg straight and lower it slowly. Do 3 sets of 10.   Quad sets: Sit on the floor with your injured leg straight and your other leg bent. Press the back of the knee of your injured leg against the floor by tightening the muscles on the top of your thigh. Hold this position 10 seconds. Relax. Do 3 sets of 10.   Straight leg raise: Lie on your back with your legs straight out in front of you. Bend the knee on your uninjured side and place the foot flat on the floor. Tighten the thigh  muscle on your injured side and lift your leg about 8 inches off the floor. Keep your leg straight and your thigh muscle tight. Slowly lower your leg back down to the floor. Do 3 sets of 10.   Step-up: Stand with the foot of your injured leg on a support 3 to 5 inches high (like a small step or block of wood). Keep your other foot flat on the floor. Shift your weight onto the injured leg on the support. Straighten your injured leg as the other leg comes off the floor. Return to the starting position by bending your injured leg and slowly lowering your uninjured leg back to the floor. Do 3 sets of 10.   Wall squat with a ball: Stand with your back, shoulders, and head against a wall. Look straight ahead. Keep your shoulders relaxed and your feet 3 feet from the wall and shoulder's width apart. Place a soccer or basketball-sized ball behind your back. Keeping your back against the wall, slowly squat down to a 45-degree angle. Your thighs will not yet be parallel to the floor. Hold this position for 10 seconds and then slowly slide back up the wall. Repeat 10 times. Build up to 3 sets of 10.   Knee stabilization: Wrap a piece of elastic tubing around the ankle of the uninjured leg. Tie a knot in the other end of the tubing and close it in a door.   0. Stand facing the door on the leg without tubing and bend your knee slightly, keeping your thigh muscles tight. While maintaining this position, move the leg with the tubing straight back behind you. Do 3 sets of 10.   0. Turn 90 degrees so the leg without tubing is closest to the door. Move the leg with tubing away from your body. Do 3 sets of 10.   0. Turn 90 degrees again so your back is to the door. Move the leg with tubing straight out in front of you. Do 3 sets of 10.   0. Turn your body 90 degrees again so the leg with tubing is closest to the door. Move the leg with tubing across your body. Do 3 sets of 10.   Hold onto a chair if you need help balancing. This  exercise can be made even more challenging by standing on a pillow while you move the leg with tubing.   Resisted terminal knee extension: Make a loop from a piece of elastic tubing by tying a knot in both ends. Close both knots in a door. Step into the loop so the tubing is around the back of your injured leg. Lift the other foot off the ground. Hold onto a chair for balance, if needed. Bend the knee on the leg with tubing about 45 degrees. Slowly straighten your leg, keeping your thigh muscle tight as you do this. Do this 10 times. Do 3 sets. An easier way to do this is to stand on both legs for better support while you do the exercise.   Standing calf stretch: Stand facing a wall with your hands on the wall at about eye level. Keep your injured leg back with your heel on the floor. Keep the other leg forward with the knee bent. Turn your back foot slightly inward (as if you were pigeon-toed). Slowly lean into the wall until you feel a stretch in the back of your calf. Hold the stretch for 15 to 30 seconds. Return to the starting position. Repeat 3 times. Do this exercise several times each day.   Clam exercise: Lie on your uninjured side with your hips and knees bent and feet together. Slowly raise your top leg toward the ceiling while keeping your heels touching each other. Hold for 2 seconds and lower slowly. Do 3 sets of 10 repetitions.   Iliotibial band stretch: Side-bending: Cross one leg in front of the other leg and lean in the opposite direction from the front leg. Reach the arm on the side of the back leg over your head while you do this. Hold this position for 15 to 30 seconds. Return to the starting position. Repeat 3 times and then switch legs and repeat the exercise.   Published by [employer].  This content is reviewed periodically and is subject to change as new health information becomes available. The information is intended to inform and educate and is not a replacement for medical evaluation,  "advice, diagnosis or treatment by a healthcare professional.   Written by Jayne Ellis, MS, PT, and Magda Daniel, PT, [facility], [facility], for [facility].   ? 2010 [facility] and/or its affiliates. All Rights Reserved.   Copyright   Clinical Reference Systems 2011  Adult Health Advisor                             Follow-ups after your visit        Who to contact     If you have questions or need follow up information about today's clinic visit or your schedule please contact [facility] directly at [phone].  Normal or non-critical lab and imaging results will be communicated to you by [employer], letter or phone within 4 business days after the clinic has received the results. If you do not hear from us within 7 days, please contact the clinic through [employer] or phone. If you have a critical or abnormal lab result, we will notify you by phone as soon as possible.  Submit refill requests through [employer] or call your pharmacy and they will forward the refill request to us. Please allow 3 business days for your refill to be completed.          Additional Information About Your Visit        [employer] Information     [employer] gives you secure access to your electronic health record. If you see a primary care provider, you can also send messages to your care team and make appointments. If you have questions, please call your primary care clinic.  If you do not have a primary care provider, please call [phone] and they will assist you.        Care EveryWhere ID     This is your Care EveryWhere ID. This could be used by other organizations to access your [facility] medical records  [id number]        Your Vitals Were     Respirations Height BMI (Body Mass Index)             18 1.803 m (5' 11\") 32.37 kg/m2          Blood Pressure from Last 3 Encounters:   02/03/17 118/75   01/30/17 118/78   10/20/16 130/76    Weight from Last 3 Encounters:   02/09/17 105.235 kg (232 lb)   01/30/17 106.369 kg (234 lb 8 oz)   10/27/16 " 107.049 kg (236 lb)              Today, you had the following     No orders found for display       Primary Care Provider Office Phone # Fax #    QUIN Villegas [facility] [phone]       [facility] [address] [facility] [address]        Thank you!     Thank you for choosing [facility]  for your care. Our goal is always to provide you with excellent care. Hearing back from our patients is one way we can continue to improve our services. Please take a few minutes to complete the written survey that you may receive in the mail after your visit with us. Thank you!             Your Updated Medication List - Protect others around you: Learn how to safely use, store and throw away your medicines at www.disposemymeds.org.          This list is accurate as of: 2/9/17  8:03 AM.  Always use your most recent med list.                   Brand Name Dispense Instructions for use    albuterol 108 (90 BASE) MCG/ACT Inhaler   Generic drug:  albuterol     2 Inhaler    Inhale 2 puffs into the lungs 4 times daily as needed for shortness of breath / dyspnea or wheezing       ASTELIN 0.1 % spray   Generic drug:  azelastine     1 Bottle    Spray 1-2 sprays into both nostrils 2 times daily       CLARITIN 10 MG tablet   Generic drug:  loratadine     30 tablet    Take 1 tablet (10 mg) by mouth daily       IBUPROFEN PO      Take 200 mg by mouth as needed for moderate pain       oxyCODONE-acetaminophen 5-325 MG per tablet    PERCOCET    30 tablet    Take 1-2 tablets by mouth every 4 hours as needed for pain (moderate to severe)

## 2017-02-09 NOTE — NURSING NOTE
"Chief Complaint   Patient presents with     RECHECK     Left knee scope on 2/3/17.       Initial Resp 18  Ht 1.803 m (5' 11\")  Wt 105.235 kg (232 lb)  BMI 32.37 kg/m2 Estimated body mass index is 32.37 kg/(m^2) as calculated from the following:    Height as of this encounter: 1.803 m (5' 11\").    Weight as of this encounter: 105.235 kg (232 lb).  Medication Reconciliation: complete   Akila Leon MA      "

## 2017-03-09 ENCOUNTER — TELEPHONE (OUTPATIENT)
Dept: ORTHOPEDICS | Facility: CLINIC | Age: 37
End: 2017-03-09

## 2017-05-23 ENCOUNTER — OFFICE VISIT (OUTPATIENT)
Dept: FAMILY MEDICINE | Facility: CLINIC | Age: 37
End: 2017-05-23
Payer: COMMERCIAL

## 2017-05-23 VITALS
HEART RATE: 80 BPM | DIASTOLIC BLOOD PRESSURE: 72 MMHG | WEIGHT: 235.5 LBS | HEIGHT: 71 IN | TEMPERATURE: 97.9 F | BODY MASS INDEX: 32.97 KG/M2 | RESPIRATION RATE: 20 BRPM | SYSTOLIC BLOOD PRESSURE: 132 MMHG

## 2017-05-23 DIAGNOSIS — K62.5 RECTAL BLEEDING: Primary | ICD-10-CM

## 2017-05-23 PROCEDURE — 99214 OFFICE O/P EST MOD 30 MIN: CPT | Performed by: NURSE PRACTITIONER

## 2017-05-23 ASSESSMENT — PAIN SCALES - GENERAL: PAINLEVEL: NO PAIN (0)

## 2017-05-23 NOTE — MR AVS SNAPSHOT
After Visit Summary   5/23/2017    Steve Vo    MRN: [id number]           Patient Information     Date Of Birth          1980        Visit Information        Provider Department      5/23/2017 11:40 AM Rowan Martini APRN CNP [facility]        Today's Diagnoses     Rectal bleeding    -  1       Follow-ups after your visit        Additional Services     GASTROENTEROLOGY ADULT REF PROCEDURE ONLY       Last Lab Result: Creatinine (mg/dL)       Date                     Value                 03/12/2014               0.89             ----------  Body mass index is 33.15 kg/(m^2).      Patient will be contacted to schedule procedure.     Please be aware that coverage of these services is subject to the terms and limitations of your health insurance plan.  Call member services at your health plan with any benefit or coverage questions.  Any procedures must be performed at a [facility] facility OR coordinated by your clinic's referral office.    Please bring the following with you to your appointment:    (1) Any X-Rays, CTs or MRIs which have been performed.  Contact the facility where they were done to arrange for  prior to your scheduled appointment.    (2) List of current medications   (3) This referral request   (4) Any documents/labs given to you for this referral                  Your next 10 appointments already scheduled     Jun 12, 2017   Procedure with Venkatesh Billings MD   [facility] ([facility])    [address] [place] [id number] [phone]              Who to contact     If you have questions or need follow up information about today's clinic visit or your schedule please contact [facility] directly at [phone].  Normal or non-critical lab and imaging results will be communicated to you by MyChart, letter or phone within 4 business days after the clinic has received the results. If you do not hear  "from us within 7 days, please contact the clinic through [employer] or phone. If you have a critical or abnormal lab result, we will notify you by phone as soon as possible.  Submit refill requests through [employer] or call your pharmacy and they will forward the refill request to us. Please allow 3 business days for your refill to be completed.          Additional Information About Your Visit        [email or web address] Information     [employer] gives you secure access to your electronic health record. If you see a primary care provider, you can also send messages to your care team and make appointments. If you have questions, please call your primary care clinic.  If you do not have a primary care provider, please call [phone] and they will assist you.        Care EveryWhere ID     This is your Care EveryWhere ID. This could be used by other organizations to access your [facility] medical records  [id number]        Your Vitals Were     Pulse Temperature Respirations Height BMI (Body Mass Index)       80 97.9  F (36.6  C) (Oral) 20 5' 10.67\" (1.795 m) 33.15 kg/m2        Blood Pressure from Last 3 Encounters:   05/23/17 132/72   02/03/17 118/75   01/30/17 118/78    Weight from Last 3 Encounters:   05/23/17 235 lb 8 oz (106.8 kg)   02/09/17 232 lb (105.2 kg)   01/30/17 234 lb 8 oz (106.4 kg)              We Performed the Following     GASTROENTEROLOGY ADULT REF PROCEDURE ONLY          Today's Medication Changes          These changes are accurate as of: 5/23/17 11:59 PM.  If you have any questions, ask your nurse or doctor.               Stop taking these medicines if you haven't already. Please contact your care team if you have questions.     albuterol 108 (90 BASE) MCG/ACT Inhaler   Generic drug:  albuterol   Stopped by:  Rowan Martini APRN CNP           ASTELIN 0.1 % spray   Generic drug:  azelastine   Stopped by:  Rowan Martini APRN CNP           IBUPROFEN PO   Stopped by:  Rowan Martini APRN CNP           " oxyCODONE-acetaminophen 5-325 MG per tablet   Commonly known as:  PERCOCET   Stopped by:  Rowan Martini APRN CNP                    Primary Care Provider Office Phone # Fax #    QUIN Villegas [phone]       [facility] [address] [facility]  [place] [address]        Thank you!     Thank you for choosing [facility]  for your care. Our goal is always to provide you with excellent care. Hearing back from our patients is one way we can continue to improve our services. Please take a few minutes to complete the written survey that you may receive in the mail after your visit with us. Thank you!             Your Updated Medication List - Protect others around you: Learn how to safely use, store and throw away your medicines at www.disposemymeds.org.          This list is accurate as of: 5/23/17 11:59 PM.  Always use your most recent med list.                   Brand Name Dispense Instructions for use    CLARITIN 10 MG tablet   Generic drug:  loratadine     30 tablet    Take 10 mg by mouth daily Reported on 5/23/2017

## 2017-05-23 NOTE — PROGRESS NOTES
SUBJECTIVE:                                                    Steve Vo is a 36 year old male who presents to clinic today for the following health issues:      Concern - Blood In Stool     Onset: Ongoing for a while    Description:   Noticed more blood in the stool and toilet this time. Feels like he has had nothing but issues since he has his appendix out. Always feeling bloated    Intensity: mild    Progression of Symptoms:  worsening and intermittent    Accompanying Signs & Symptoms:  No cramping or abdominal pain       Previous history of similar problem:   Yes    Precipitating factors:   Worsened by: N/a**    Alleviating factors:  Improved by: n/a       Therapies Tried and outcome: Has tried medication in the past for diverticulitis.     Patient reports today to discuss bleeding in stool. Does have bowel movements 1-2 times daily. He has somewhat soft stool. Infrequent straining. His diet consists of eating out a lot. He relates he as a poor nutritional diet. Does report that he does have sometimes eat raw, leafy vegetables. Denies rectal pain. Intermittent bleeding. He did have an episode of bleeding yesterday. No bleeding between bowel movements.     Current everyday smoker and occasional alcohol use. No family history of colon cancer.       Problem list and histories reviewed & adjusted, as indicated.  Additional history: as documented    Patient Active Problem List   Diagnosis     CARDIOVASCULAR SCREENING; LDL GOAL LESS THAN 160     Complex tear of medial meniscus of left knee as current injury     Chondromalacia, knee, left     Bronchitis     Electronic cigarette use     Snoring     LLQ abdominal pain     S/P arthroscopy of left knee     Past Surgical History:   Procedure Laterality Date     APPENDECTOMY  03/12/14     ARTHROSCOPY KNEE Left 2/3/2017    Med meniscectomy, DML     BIOPSY OF SKIN LESION  1990    inner lip cyst     VASECTOMY  2014       Social History   Substance Use Topics     Smoking  status: Current Every Day Smoker     Packs/day: 0.50     Years: 15.00     Last attempt to quit: 3/20/2014     Smokeless tobacco: Never Used      Comment: ecig daily     Alcohol use 0.0 oz/week     0 Standard drinks or equivalent per week      Comment: socially     Family History   Problem Relation Age of Onset     HEART DISEASE Father      PVD     Emphysema Father      Asthma No family hx of      C.A.D. No family hx of      DIABETES No family hx of      Hypertension No family hx of      CEREBROVASCULAR DISEASE No family hx of      Breast Cancer No family hx of      Cancer - colorectal No family hx of      Prostate Cancer No family hx of      Alcohol/Drug No family hx of      Allergies No family hx of      Alzheimer Disease No family hx of      Anesthesia Reaction No family hx of      Blood Disease No family hx of      CANCER No family hx of      Connective Tissue Disorder No family hx of      Circulatory No family hx of      Cardiovascular No family hx of      Arthritis No family hx of      Congenital Anomalies No family hx of      Depression No family hx of      Endocrine Disease No family hx of      Eye Disorder No family hx of      GASTROINTESTINAL DISEASE No family hx of      Genitourinary Problems No family hx of      Gynecology No family hx of      Genetic Disorder No family hx of      Lipids No family hx of      Musculoskeletal Disorder No family hx of      Neurologic Disorder No family hx of      Obesity No family hx of      OSTEOPOROSIS No family hx of      Psychotic Disorder No family hx of      Hearing Loss No family hx of      Respiratory No family hx of      Thyroid Disease No family hx of            Reviewed and updated as needed this visit by clinical staff  Tobacco  Allergies  Med Hx  Surg Hx  Fam Hx  Soc Hx      Reviewed and updated as needed this visit by Provider        This document serves as a record of the services and decisions personally performed and made by QUIN Becerra CNP. It was  "created on his behalf by Alysia Segal, a trained medical scribe. The creation of this document is based the provider's statements to the medical scribe.    Scribe Alysia Segal 12:04 PM 5/23/2017      ROS:  Constitutional, HEENT, cardiovascular, pulmonary, gi and gu systems are negative, except as otherwise noted.    OBJECTIVE:                                                    /90  Pulse 80  Temp 97.9  F (36.6  C) (Oral)  Resp 20  Ht 1.795 m (5' 10.67\")  Wt 106.8 kg (235 lb 8 oz)  BMI 33.15 kg/m2  Body mass index is 33.15 kg/(m^2).  GENERAL APPEARANCE: healthy, alert and no distress  EYES: Eyes grossly normal to inspection, PERRL and conjunctivae and sclerae normal  RESP: lungs clear to auscultation - no rales, rhonchi or wheezes  CV: regular rates and rhythm, normal S1 S2, no S3 or S4 and no murmur, click or rub  ABDOMEN: soft, nontender, without hepatosplenomegaly or masses and bowel sounds normal  SKIN: no suspicious lesions or rashes  NEURO: Normal strength and tone, mentation intact and speech normal  PSYCH: mentation appears normal and affect normal/bright    Diagnostic Test Results:  none      ASSESSMENT/PLAN:                                                        ICD-10-CM    1. Rectal bleeding K62.5 GASTROENTEROLOGY ADULT REF PROCEDURE ONLY   Intermittent chronic issue. Nutrition likely playing largest role.   * Noted to patient that I want to hear that his stool is softer.  * Explained it could take up to 4-6 weeks of soft stool for rectum to fully heal.  * Scheduled colonoscopy for future.  * Consider Citricell or Benefiber as fiber supplement.  * Return to clinic with any new or worsening symptoms, and as needed.     The information in this document, created by a scribe for me, accurately reflects the services I personally performed and the decisions made by me. I have reviewed and approved this document for accuracy.      QUIN Terrell [facility]"

## 2017-06-12 ENCOUNTER — SURGERY (OUTPATIENT)
Age: 37
End: 2017-06-12

## 2017-06-12 ENCOUNTER — HOSPITAL ENCOUNTER (OUTPATIENT)
Facility: CLINIC | Age: 37
Discharge: HOME | End: 2017-06-12
Attending: INTERNAL MEDICINE | Admitting: INTERNAL MEDICINE
Payer: COMMERCIAL

## 2017-06-12 VITALS
TEMPERATURE: 98 F | SYSTOLIC BLOOD PRESSURE: 122 MMHG | DIASTOLIC BLOOD PRESSURE: 93 MMHG | OXYGEN SATURATION: 95 % | HEART RATE: 73 BPM | RESPIRATION RATE: 16 BRPM

## 2017-06-12 LAB — COLONOSCOPY: NORMAL

## 2017-06-12 PROCEDURE — 25000125 ZZHC RX 250: Performed by: INTERNAL MEDICINE

## 2017-06-12 PROCEDURE — 45385 COLONOSCOPY W/LESION REMOVAL: CPT | Performed by: INTERNAL MEDICINE

## 2017-06-12 PROCEDURE — 25000128 H RX IP 250 OP 636: Performed by: INTERNAL MEDICINE

## 2017-06-12 PROCEDURE — 88305 TISSUE EXAM BY PATHOLOGIST: CPT | Mod: 26 | Performed by: INTERNAL MEDICINE

## 2017-06-12 PROCEDURE — 40000296 ZZH STATISTIC ENDO RECOVERY CLASS 1:2 FIRST HOUR: Performed by: INTERNAL MEDICINE

## 2017-06-12 PROCEDURE — 45380 COLONOSCOPY AND BIOPSY: CPT | Performed by: INTERNAL MEDICINE

## 2017-06-12 PROCEDURE — 88305 TISSUE EXAM BY PATHOLOGIST: CPT | Performed by: INTERNAL MEDICINE

## 2017-06-12 RX ORDER — FENTANYL CITRATE 50 UG/ML
INJECTION, SOLUTION INTRAMUSCULAR; INTRAVENOUS PRN
Status: DISCONTINUED | OUTPATIENT
Start: 2017-06-12 | End: 2017-06-12 | Stop reason: HOSPADM

## 2017-06-12 RX ORDER — LIDOCAINE 40 MG/G
CREAM TOPICAL
Status: DISCONTINUED | OUTPATIENT
Start: 2017-06-12 | End: 2017-06-12 | Stop reason: HOSPADM

## 2017-06-12 RX ORDER — ONDANSETRON 2 MG/ML
4 INJECTION INTRAMUSCULAR; INTRAVENOUS
Status: DISCONTINUED | OUTPATIENT
Start: 2017-06-12 | End: 2017-06-12 | Stop reason: HOSPADM

## 2017-06-12 RX ADMIN — MIDAZOLAM HYDROCHLORIDE 2 MG: 1 INJECTION, SOLUTION INTRAMUSCULAR; INTRAVENOUS at 08:02

## 2017-06-12 RX ADMIN — MIDAZOLAM HYDROCHLORIDE 1 MG: 1 INJECTION, SOLUTION INTRAMUSCULAR; INTRAVENOUS at 08:04

## 2017-06-12 RX ADMIN — MIDAZOLAM HYDROCHLORIDE 1 MG: 1 INJECTION, SOLUTION INTRAMUSCULAR; INTRAVENOUS at 08:05

## 2017-06-12 RX ADMIN — FENTANYL CITRATE 25 MCG: 50 INJECTION, SOLUTION INTRAMUSCULAR; INTRAVENOUS at 08:10

## 2017-06-12 RX ADMIN — FENTANYL CITRATE 50 MCG: 50 INJECTION, SOLUTION INTRAMUSCULAR; INTRAVENOUS at 08:02

## 2017-06-12 RX ADMIN — MIDAZOLAM HYDROCHLORIDE 1 MG: 1 INJECTION, SOLUTION INTRAMUSCULAR; INTRAVENOUS at 08:03

## 2017-06-12 NOTE — CONSULTS
[facility] GI Pre-Procedure Physical Assessment    Steve Vo MRN# [id number]   Age: 36 year old YOB: 1980      Date of Surgery: 6/12/2017  Location [facility]      Date of Exam 6/12/2017 Facility (Same day)       Home clinic: [facility]  Primary care provider: Rowan Martini         Active problem list:   Patient Active Problem List   Diagnosis     CARDIOVASCULAR SCREENING; LDL GOAL LESS THAN 160     Complex tear of medial meniscus of left knee as current injury     Chondromalacia, knee, left     Bronchitis     Electronic cigarette use     Snoring     LLQ abdominal pain     S/P arthroscopy of left knee            Medications (include herbals and vitamins):   Any Plavix use in the last 7 days?  No     Current Facility-Administered Medications   Medication     lidocaine 1 % 1 mL     lidocaine (LMX4) kit     sodium chloride (PF) 0.9% PF flush 3 mL     sodium chloride (PF) 0.9% PF flush 3 mL     sodium chloride (PF) 0.9% PF flush 3 mL     ondansetron (ZOFRAN) injection 4 mg             Allergies:      Allergies   Allergen Reactions     Penicillins Rash     Allergy to Latex?  No  Allergy to tape?    No          Social History:     Social History   Substance Use Topics     Smoking status: Current Every Day Smoker     Packs/day: 0.50     Years: 15.00     Last attempt to quit: 3/20/2014     Smokeless tobacco: Never Used      Comment: ecig daily     Alcohol use 0.0 oz/week     0 Standard drinks or equivalent per week      Comment: socially            Physical Exam:   All vitals have been reviewed  Blood pressure 133/79, temperature 98  F (36.7  C), temperature source Oral, resp. rate 18, SpO2 98 %.  Airway assessment:   Patient is able to open mouth wide  Patient is able to stick out tongue      Lungs:   No increased work of breathing, good air exchange, clear to auscultation bilaterally, no crackles or wheezing      Cardiovascular:   Normal apical impulse, regular  rate and rhythm, normal S1 and S2, no S3 or S4, and no murmur noted           Lab / Radiology Results:   All laboratory data reviewed          Assessment:   Appropriately NPO  Chief complaint or anatomic assessment of involved area: diarrhea, blood in stool         Plan:   Moderate (conscious) sedation     Patient's active problems diagnostically and therapeutically optimized for the planned procedure  Risks, benefits, alternatives to sedation and blood explained and consent obtained  Risks, benefits, alternatives to procedure explained and consent obtained  Orders and progress notes are in the chart  Discharge from Phase 1 and / or Phase 2 recovery when patient meets criteria    I have reviewed the history and physical, lab finding(s), diagnostic data, medicaitons, and the plan for sedation.  I have determined this patient to be an appropriate candidate for the planned sedation / procedure and have reassessed the patient immediately prior to sedation / procedure.    I have personally and medically directed the administration of medications used.    Venkatesh Billings MD

## 2017-06-12 NOTE — IP AVS SNAPSHOT
[facility] Endoscopy    [address] [address]    Phone:  [phone]                                       After Visit Summary   6/12/2017    Steve Vo    MRN: [id number]           After Visit Summary Signature Page     I have received my discharge instructions, and my questions have been answered. I have discussed any challenges I see with this plan with the nurse or doctor.    ..........................................................................................................................................  Patient/Patient Representative Signature      ..........................................................................................................................................  Patient Representative Print Name and Relationship to Patient    ..................................................               ................................................  Date                                            Time    ..........................................................................................................................................  Reviewed by Signature/Title    ...................................................              ..............................................  Date                                                            Time

## 2017-06-12 NOTE — IP AVS SNAPSHOT
MRN:[id number]                      After Visit Summary   6/12/2017    Steve Vo    MRN: [id number]           Thank you!     Thank you for choosing [facility] for your care. Our goal is always to provide you with excellent care. Hearing back from our patients is one way we can continue to improve our services. Please take a few minutes to complete the written survey that you may receive in the mail after you visit with us. Thank you!        Patient Information     Date Of Birth          1980        About your hospital stay     You were admitted on:  June 12, 2017 You last received care in the:  [facility]    You were discharged on:  June 12, 2017       Who to Call     For medical emergencies, please call 911.  For non-urgent questions about your medical care, please call your primary care provider or clinic, [phone]  For questions related to your surgery, please call your surgery clinic        Attending Provider     Provider Specialty    Venkatesh Billings MD Gastroenterology       Primary Care Provider Office Phone # Fax #    Rowan QUIN Rivas [facility] [phone]      Pending Results     No orders found from 6/10/2017 to 6/13/2017.            Admission Information     Date & Time Provider Department Dept. Phone    6/12/2017 Venkatesh Billings MD [facility] Endoscopy [phone]      Your Vitals Were     Blood Pressure Pulse Temperature Respirations Pulse Oximetry       112/77 70 98  F (36.7  C) (Oral) 16 95%       MyChart Information     MyChart gives you secure access to your electronic health record. If you see a primary care provider, you can also send messages to your care team and make appointments. If you have questions, please call your primary care clinic.  If you do not have a primary care provider, please call [phone] and they will assist you.        Care EveryWhere ID     This is your Care EveryWhere ID. This could be used by  other organizations to access your [facility] medical records  [id number]           Review of your medicines      CONTINUE these medicines which have NOT CHANGED        Dose / Directions    CLARITIN 10 MG tablet   Generic drug:  loratadine        Dose:  10 mg   Take 10 mg by mouth daily Reported on 5/23/2017   Quantity:  30 tablet   Refills:  1                Protect others around you: Learn how to safely use, store and throw away your medicines at www.disposemymeds.org.             Medication List: This is a list of all your medications and when to take them. Check marks below indicate your daily home schedule. Keep this list as a reference.      Medications           Morning Afternoon Evening Bedtime As Needed    CLARITIN 10 MG tablet   Take 10 mg by mouth daily Reported on 5/23/2017   Generic drug:  loratadine

## 2017-06-14 LAB — COPATH REPORT: NORMAL

## 2018-08-30 ENCOUNTER — VIRTUAL VISIT (OUTPATIENT)
Dept: FAMILY MEDICINE | Facility: CLINIC | Age: 38
End: 2018-08-30
Payer: COMMERCIAL

## 2018-08-30 DIAGNOSIS — H10.32 ACUTE BACTERIAL CONJUNCTIVITIS OF LEFT EYE: Primary | ICD-10-CM

## 2018-08-30 PROCEDURE — 99441 ZZC PHYSICIAN TELEPHONE EVALUATION 5-10 MIN: CPT | Performed by: NURSE PRACTITIONER

## 2018-08-30 RX ORDER — TOBRAMYCIN 3 MG/ML
1-2 SOLUTION/ DROPS OPHTHALMIC 4 TIMES DAILY
Qty: 1 BOTTLE | Refills: 0 | Status: SHIPPED | OUTPATIENT
Start: 2018-08-30 | End: 2020-10-05

## 2018-08-30 NOTE — PROGRESS NOTES
"Steve Vo is a 37 year old male who is being evaluated via a telephone visit.      The patient has been notified of following:     \"This telephone visit will be conducted via a call between you and your physician/provider. We have found that certain health care needs can be provided without the need for a physical exam.  This service lets us provide the care you need with a short phone conversation.  If a prescription is necessary we can send it directly to your pharmacy.  If lab work is needed we can place an order for that and you can then stop by our lab to have the test done at a later time.    We will bill your insurance company for this service.  Please check with your medical insurance if this type of visit is covered. You may be responsible for the cost of this type of visit if insurance coverage is denied.  The typical cost is $30 (10min), $59 (11-20min) and $85 (21-30min).  Most often these visits are shorter than 10 minutes.    If during the course of the call the physician/provider feels a telephone visit is not appropriate, you will not be charged for this service.\"       Consent has been obtained for this service by care team member: yes.   See the scanned image in the medical record.    Steve Vo complains of  Conjunctivitis      I have reviewed and updated the patient's Past Medical History, Social History, Family History and Medication List.    ALLERGIES  Penicillins    Lucía HINOJOSA CMA ([place])   (MA signature)    Preferred number to be called back at is [phone]    Additional provider notes: Left eye.  Awakening up crusty-   Focus changes  Itching,  drainage during the day  Clear.  wearing contacts- no  Wears glasses  No foreign body  Lid swelling- no  Mild puffiness of lower lid   no lid redness  Sclera injection- mild  URI symptoms- mid nasal congestion, maybe allergies   Light sensitivity-mild        Assessment/Plan:  Acute bacterial conjunctivitis of left eye       Pink eye cares. " Warning signs discussed Advised PE.   I have reviewed the note as documented above.  This accurately captures the substance of my conversation with the patient.    Total time of call between patient and provider was 6 minutes

## 2018-08-30 NOTE — MR AVS SNAPSHOT
After Visit Summary   8/30/2018    Steve Vo    MRN: [id number]           Patient Information     Date Of Birth          1980        Visit Information        Provider Department      8/30/2018 12:20 PM Rowan Martini APRN CNP [facility]        Today's Diagnoses     Acute bacterial conjunctivitis of left eye    -  1       Follow-ups after your visit        Who to contact     If you have questions or need follow up information about today's clinic visit or your schedule please contact [facility] directly at [phone].  Normal or non-critical lab and imaging results will be communicated to you by [employer], letter or phone within 4 business days after the clinic has received the results. If you do not hear from us within 7 days, please contact the clinic through [employer] or phone. If you have a critical or abnormal lab result, we will notify you by phone as soon as possible.  Submit refill requests through [employer] or call your pharmacy and they will forward the refill request to us. Please allow 3 business days for your refill to be completed.          Additional Information About Your Visit        MyChart Information     [employer] gives you secure access to your electronic health record. If you see a primary care provider, you can also send messages to your care team and make appointments. If you have questions, please call your primary care clinic.  If you do not have a primary care provider, please call [phone] and they will assist you.        Care EveryWhere ID     This is your Care EveryWhere ID. This could be used by other organizations to access your [facility] medical records  [id number]         Blood Pressure from Last 3 Encounters:   06/12/17 (!) 122/93   05/23/17 132/72   02/03/17 118/75    Weight from Last 3 Encounters:   05/23/17 235 lb 8 oz (106.8 kg)   02/09/17 232 lb (105.2 kg)   01/30/17 234 lb 8 oz (106.4 kg)              Today, you had the  following     No orders found for display         Today's Medication Changes          These changes are accurate as of 8/30/18  2:48 PM.  If you have any questions, ask your nurse or doctor.               Start taking these medicines.        Dose/Directions    tobramycin 0.3 % ophthalmic solution   Commonly known as:  TOBREX   Used for:  Acute bacterial conjunctivitis of left eye        Dose:  1-2 drop   Place 1-2 drops Into the left eye 4 times daily   Quantity:  1 Bottle   Refills:  0            Where to get your medicines      These medications were sent to [facility] - [place], [place] - [address]  [address], [place] [address]    Hours:  test fax sent successfully 1/20/04  kr Phone:  [phone]     tobramycin 0.3 % ophthalmic solution                Primary Care Provider Office Phone # Fax #    Rowan WILFREDO Martini APRN [facility] [phone] [address]  [place] [address]        Equal Access to Services     [facility] AH: Hadii aad ku hadasho Soomaali, waaxda luqadaha, qaybta kaalmada adeegyada, waxay idiin hayaan adeeg kharash la'roxy . So [facility] [phone].    ATENCIÓN: Si habla español, tiene a carranza disposición servicios gratuitos de asistencia lingüística. [facility] [phone].    We comply with applicable federal civil rights laws and [place] laws. We do not discriminate on the basis of race, color, national origin, age, disability, sex, sexual orientation, or gender identity.            Thank you!     [facility]. Our goal is always to provide you with excellent care. Hearing back from our patients is one way we can continue to improve our services. Please take a few minutes to complete the written survey that you may receive in the mail after your visit with us. Thank you!             Your Updated Medication List - Protect others around you: Learn how to safely use, store and throw  away your medicines at www.disposemymeds.org.          This list is accurate as of 8/30/18  2:48 PM.  Always use your most recent med list.                   Brand Name Dispense Instructions for use Diagnosis    CLARITIN 10 MG tablet   Generic drug:  loratadine     30 tablet    Take 10 mg by mouth daily Reported on 5/23/2017        tobramycin 0.3 % ophthalmic solution    TOBREX    1 Bottle    Place 1-2 drops Into the left eye 4 times daily    Acute bacterial conjunctivitis of left eye

## 2020-02-23 ENCOUNTER — HEALTH MAINTENANCE LETTER (OUTPATIENT)
Age: 40
End: 2020-02-23

## 2020-09-10 NOTE — PROGRESS NOTES
SUBJECTIVE:   CC: Steve Vo is an 39 year old male who presents for preventative health visit.     Healthy Habits:     Getting at least 3 servings of Calcium per day:  Yes    Bi-annual eye exam:  Yes    Dental care twice a year:  Yes    Sleep apnea or symptoms of sleep apnea:  None    Diet:  Regular (no restrictions)    Frequency of exercise:  1 day/week    Duration of exercise:  15-30 minutes    Taking medications regularly:  Yes    Barriers to taking medications:  None    Medication side effects:  Not applicable    PHQ-2 Total Score: 0    Additional concerns today:  Yes (lump and urine freguancy )    Concern - lump in groin area and urine frequency   Onset: lump=1 month urine=last 3 days     Description:   Lump located left lateral shaft of the penis    Progression of Symptoms:  Stable or worsening    Previous history of similar problem:   none    Precipitating factors:   Worsened by: none    Alleviating factors:  Improved by: none     Therapies Tried and outcome: none    Patient reports noticing a lump on the left lateral aspect of his penis.  Initially it was the size of a right screen; however, now it is just under a centimeter.  Has noticed it has waxed and waned in size.  He has not noticed any drainage.  Is not painful.  Has not noticed any overlying rash.  No other lesions noted.    Additionally over the last 3 days he has had issues following urination.  He states he can start a stream no problem he has good force.  He is does feel he is completely able to empty.  He does have some dribbling at the end of urinating however, which is new for him.    He also has history of tubular adenoma on colonoscopy.  He is due for repeat colonoscopy.    He is interested in smoking cessation.  He has tried Chantix in the past.  He worked well for him other than the vivid dreams.  He would like to try this again.  He states nicotine replacement options did not work well for him in the past.    He does have family  history of diabetes and like screening for this today given he is overweight.    He is okay getting his flu shot as well as a pneumonia shot given his smoking status.    Today's PHQ-2 Score:   PHQ-2 (  [employer]) 2020   Q1: Little interest or pleasure in doing things 0   Q2: Feeling down, depressed or hopeless 0   PHQ-2 Score 0   Q1: Little interest or pleasure in doing things Not at all   Q2: Feeling down, depressed or hopeless Not at all   PHQ-2 Score 0     Abuse: Current or Past(Physical, Sexual or Emotional)- No  Do you feel safe in your environment? Yes    Social History     Tobacco Use     Smoking status: Current Every Day Smoker     Packs/day: 0.50     Years: 15.00     Pack years: 7.50     Types: Cigarettes     Last attempt to quit: 3/20/2014     Years since quittin.4     Smokeless tobacco: Never Used     Tobacco comment: <1 ppd   Substance Use Topics     Alcohol use: Yes     Alcohol/week: 0.0 standard drinks     Comment: socially     If you drink alcohol do you typically have >3 drinks per day or >7 drinks per week? Yes    Alcohol Use 2020   Prescreen: >3 drinks/day or >7 drinks/week? No   Prescreen: >3 drinks/day or >7 drinks/week? -   AUDIT SCORE  4     AUDIT - Alcohol Use Disorders Identification Test - Reproduced from the World Health Organization Audit 2001 (Second Edition) 2020   1.  How often do you have a drink containing alcohol? 2 to 3 times a week   2.  How many drinks containing alcohol do you have on a typical day when you are drinking? 1 or 2   3.  How often do you have five or more drinks on one occasion? Less than monthly   4.  How often during the last year have you found that you were not able to stop drinking once you had started? Never   5.  How often during the last year have you failed to do what was normally expected of you because of drinking? Never   6.  How often during the last year have you needed a first drink in the morning to get yourself going after a heavy  drinking session? Never   7.  How often during the last year have you had a feeling of guilt or remorse after drinking? Never   8.  How often during the last year have you been unable to remember what happened the night before because of your drinking? Never   9.  Have you or someone else been injured because of your drinking? No   10. Has a relative, friend, doctor or other health care worker been concerned about your drinking or suggested you cut down? No   TOTAL SCORE 4     Last PSA: No results found for: PSA    Reviewed orders with patient. Reviewed health maintenance and updated orders accordingly - Yes  BP Readings from Last 3 Encounters:   20 136/86   17 (!) 122/93   17 132/72    Wt Readings from Last 3 Encounters:   20 107.7 kg (237 lb 8 oz)   17 106.8 kg (235 lb 8 oz)   17 105.2 kg (232 lb)                  Patient Active Problem List   Diagnosis     CARDIOVASCULAR SCREENING; LDL GOAL LESS THAN 160     Complex tear of medial meniscus of left knee as current injury     Chondromalacia, knee, left     Bronchitis     Electronic cigarette use     Snoring     LLQ abdominal pain     S/P arthroscopy of left knee     Past Surgical History:   Procedure Laterality Date     APPENDECTOMY  14     ARTHROSCOPY KNEE Left 2/3/2017    Med meniscectomy, DML     BIOPSY OF SKIN LESION      inner lip cyst     COLONOSCOPY N/A 2017    Procedure: COMBINED COLONOSCOPY, SINGLE OR MULTIPLE BIOPSY/POLYPECTOMY BY BIOPSY;  COLONOSCOPY with biopsy and polypectomy by snare.;  Surgeon: Venkatesh Billings MD;  Location:  GI     VASECTOMY         Social History     Tobacco Use     Smoking status: Current Every Day Smoker     Packs/day: 0.50     Years: 15.00     Pack years: 7.50     Types: Cigarettes     Last attempt to quit: 3/20/2014     Years since quittin.4     Smokeless tobacco: Never Used     Tobacco comment: <1 ppd   Substance Use Topics     Alcohol use: Yes     Alcohol/week:  0.0 standard drinks     Comment: socially     Family History   Problem Relation Age of Onset     Heart Disease Father         PVD     Emphysema Father      Diabetes Mother      Asthma No family hx of      C.A.D. No family hx of      Hypertension No family hx of      Cerebrovascular Disease No family hx of      Breast Cancer No family hx of      Cancer - colorectal No family hx of      Prostate Cancer No family hx of      Alcohol/Drug No family hx of      Allergies No family hx of      Alzheimer Disease No family hx of      Anesthesia Reaction No family hx of      Blood Disease No family hx of      Cancer No family hx of      Connective Tissue Disorder No family hx of      Circulatory No family hx of      Cardiovascular No family hx of      Arthritis No family hx of      Congenital Anomalies No family hx of      Depression No family hx of      Endocrine Disease No family hx of      Eye Disorder No family hx of      Gastrointestinal Disease No family hx of      Genitourinary Problems No family hx of      Gynecology No family hx of      Genetic Disorder No family hx of      Lipids No family hx of      Musculoskeletal Disorder No family hx of      Neurologic Disorder No family hx of      Obesity No family hx of      Osteoporosis No family hx of      Psychotic Disorder No family hx of      Hearing Loss No family hx of      Respiratory No family hx of      Thyroid Disease No family hx of          Current Outpatient Medications   Medication Sig Dispense Refill     Cetirizine HCl (ZYRTEC PO)        varenicline (CHANTIX ROYAL) 0.5 MG X 11 & 1 MG X 42 tablet Take 0.5 mg tab daily for 3 days, THEN 0.5 mg tab twice daily for 4 days, THEN 1 mg twice daily. 53 tablet 0     loratadine (CLARITIN) 10 MG tablet Take 10 mg by mouth daily Reported on 5/23/2017 30 tablet 1     tobramycin (TOBREX) 0.3 % ophthalmic solution Place 1-2 drops Into the left eye 4 times daily (Patient not taking: Reported on 9/11/2020) 1 Bottle 0     Allergies  "  Allergen Reactions     Penicillins Rash       Reviewed and updated as needed this visit by clinical staff  Tobacco  Allergies  Meds  Problems  Med Hx  Surg Hx  Fam Hx  Soc Hx          Reviewed and updated as needed this visit by Provider  Tobacco  Allergies  Meds  Problems  Med Hx  Surg Hx  Fam Hx  Soc Hx         Past Medical History:   Diagnosis Date     Chondromalacia, knee, left      NO ACTIVE PROBLEMS      Tubular adenoma     10 mm      Past Surgical History:   Procedure Laterality Date     APPENDECTOMY  03/12/14     ARTHROSCOPY KNEE Left 2/3/2017    Med meniscectomy, DML     BIOPSY OF SKIN LESION  1990    inner lip cyst     COLONOSCOPY N/A 6/12/2017    Procedure: COMBINED COLONOSCOPY, SINGLE OR MULTIPLE BIOPSY/POLYPECTOMY BY BIOPSY;  COLONOSCOPY with biopsy and polypectomy by snare.;  Surgeon: Venkatesh Billings MD;  Location:  GI     VASECTOMY  2014     Review of Systems   Constitutional: Negative for chills and fever.   HENT: Positive for congestion. Negative for ear pain.    Eyes: Negative for pain.   Respiratory: Negative for cough.    Cardiovascular: Negative for chest pain.   Gastrointestinal: Positive for abdominal pain. Negative for constipation, diarrhea and hematochezia.   Genitourinary: Positive for frequency. Negative for hematuria.   Neurological: Negative for dizziness.   Psychiatric/Behavioral: The patient is not nervous/anxious.      OBJECTIVE:   /86 (BP Location: Left arm, Patient Position: Sitting, Cuff Size: Adult Large)   Pulse 74   Temp 97.8  F (36.6  C) (Temporal)   Resp 16   Ht 1.798 m (5' 10.79\")   Wt 107.7 kg (237 lb 8 oz)   SpO2 97%   BMI 33.32 kg/m      Physical Exam  GENERAL: Obese male.  Healthy, alert and no distress  EYES: Eyes grossly normal to inspection, PERRL and conjunctivae and sclerae normal  HENT: ear canals and TM's normal, nose and mouth without ulcers or lesions  NECK: no adenopathy, no asymmetry, masses, or scars and thyroid normal to " palpation  RESP: lungs clear to auscultation - no rales, rhonchi or wheezes  CV: regular rate and rhythm, normal S1 S2, no S3 or S4, no murmur, click or rub, no peripheral edema and peripheral pulses strong  ABDOMEN: soft, nontender, no hepatosplenomegaly, no masses and bowel sounds normal  MS: no gross musculoskeletal defects noted, no edema  SKIN: no suspicious lesions or rashes  NEURO: Normal strength and tone, mentation intact and speech normal  PSYCH: mentation appears normal, affect normal/bright  : Left firm mobile nodule in the left lateral aspect of the penile shaft without overlying skin changes or rash.    Diagnostic Test Results:  Labs pending, see orders    ASSESSMENT/PLAN:   1. Routine general medical examination at a health care facility: Flu shot pneumococcal shots given today.  Discussed personal health and safety.  Due for routine lab work.  Interested in quitting smoking and started on Chantix.  Due for follow-up colonoscopy.  Acute issues noted and referred to urology.  - INFLUENZA VACCINE IM > 6 MONTHS VALENT IIV4 [65183]  - Lipid panel reflex to direct LDL Fasting  - Pneumococcal vaccine 23 valent PPSV23  (Pneumovax) [[id number]]  - ADMIN: Vaccine, Initial (26419)  - EA ADD'L VACCINE  - Basic metabolic panel  (Ca, Cl, CO2, Creat, Gluc, K, Na, BUN)  - Hemoglobin A1c    2. Penile mass: Recommend referral to urology for further evaluation and consideration of removal.  Given it waxes and wanes in size this likely represents a cyst.  Can also discuss urinary dribbling at that time.  - UROLOGY ADULT REFERRAL; Future    3. Tubular adenoma of colon: Due for follow-up colonoscopy.  - GASTROENTEROLOGY ADULT REF PROCEDURE ONLY; Future    4. Encounter for tobacco use cessation counselin minutes spent discussing smoking cessation options.  - varenicline (CHANTIX ROYAL) 0.5 MG X 11 & 1 MG X 42 tablet; Take 0.5 mg tab daily for 3 days, THEN 0.5 mg tab twice daily for 4 days, THEN 1 mg twice daily.  Dispense:  "53 tablet; Refill: 0  - SMOKING CESSATION COUNSELING, 3-10 MIN    5. Needs flu shot  - INFLUENZA VACCINE IM > 6 MONTHS VALENT IIV4 [46625]  - EA ADD'L VACCINE    6. Need for 23-polyvalent pneumococcal polysaccharide vaccine  - Pneumococcal vaccine 23 valent PPSV23  (Pneumovax) [[id number]]  - ADMIN: Vaccine, Initial (88325)    7. Screening cholesterol level  - Lipid panel reflex to direct LDL Fasting    8. Screening for diabetes mellitus  - Basic metabolic panel  (Ca, Cl, CO2, Creat, Gluc, K, Na, BUN)  - Hemoglobin A1c    9. Screening for thyroid disorder  - TSH with free T4 reflex      COUNSELING:   Reviewed preventive health counseling, as reflected in patient instructions       Regular exercise       Healthy diet/nutrition       Vision screening       Hearing screening       Immunizations    Vaccinated for: Influenza and Pneumococcal         Colon cancer screening       One time pneumovax for smokers    Estimated body mass index is 33.32 kg/m  as calculated from the following:    Height as of this encounter: 1.798 m (5' 10.79\").    Weight as of this encounter: 107.7 kg (237 lb 8 oz).     Weight management plan: Discussed healthy diet and exercise guidelines    He reports that he has been smoking cigarettes. He has a 7.50 pack-year smoking history. He has never used smokeless tobacco.  Tobacco Cessation Action Plan:   Pharmacotherapies : Chantix    Counseling Resources:  ATP IV Guidelines  Pooled Cohorts Equation Calculator  FRAX Risk Assessment  ICSI Preventive Guidelines  Dietary Guidelines for Americans, 2010  USDA's MyPlate  ASA Prophylaxis  Lung CA Screening    Jaquan Morse MD  [facility]    This chart is completed utilizing dictation software; typos and/or incorrect word substitutions may unintentionally occur.   "

## 2020-09-10 NOTE — PATIENT INSTRUCTIONS
Important Takeaway Points From This Visit:    Look into the fodmap diet.      As always, please call with any questions or concerns. I look forward to seeing you again soon!    Take care,  Dr. Morse    Your current medication list is printed. Please keep this with you - it is helpful to bring this current list to any other medical appointments. It can also be helpful if you ever go to the emergency room or hospital.    If you had lab testing today we will call you with the results. The phone number we will call with your results is # [phone] (home) [phone] (work). If this is not the best number please call our clinic and change the number.    If you need any refills, please call your pharmacy and they will contact us.    If you have any further concerns or wish to schedule another appointment, please call our office at [phone].    If you have a medical emergency, please call 261.    Thank you for coming to [facility]!        Preventive Health Recommendations  Male Ages 26 - 39    Yearly exam:             See your health care provider every year in order to  o   Review health changes.   o   Discuss preventive care.    o   Review your medicines if your doctor has prescribed any.    You should be tested each year for STDs (sexually transmitted diseases), if you re at risk.     After age 35, talk to your provider about cholesterol testing. If you are at risk for heart disease, have your cholesterol tested at least every 5 years.     If you are at risk for diabetes, you should have a diabetes test (fasting glucose).  Shots: Get a flu shot each year. Get a tetanus shot every 10 years.     Nutrition:    Eat at least 5 servings of fruits and vegetables daily.     Eat whole-grain bread, whole-wheat pasta and brown rice instead of white grains and rice.     Get adequate Calcium and Vitamin D.     Lifestyle    Exercise for at least 150 minutes a week (30 minutes a day, 5 days a week).  This will help you control your weight and prevent disease.     Limit alcohol to one drink per day.     No smoking.     Wear sunscreen to prevent skin cancer.     See your dentist every six months for an exam and cleaning.

## 2020-09-11 ENCOUNTER — OFFICE VISIT (OUTPATIENT)
Dept: FAMILY MEDICINE | Facility: CLINIC | Age: 40
End: 2020-09-11
Payer: COMMERCIAL

## 2020-09-11 VITALS
TEMPERATURE: 97.8 F | HEART RATE: 74 BPM | DIASTOLIC BLOOD PRESSURE: 86 MMHG | BODY MASS INDEX: 33.25 KG/M2 | SYSTOLIC BLOOD PRESSURE: 136 MMHG | HEIGHT: 71 IN | WEIGHT: 237.5 LBS | RESPIRATION RATE: 16 BRPM | OXYGEN SATURATION: 97 %

## 2020-09-11 DIAGNOSIS — Z13.1 SCREENING FOR DIABETES MELLITUS: ICD-10-CM

## 2020-09-11 DIAGNOSIS — N48.89 PENILE MASS: ICD-10-CM

## 2020-09-11 DIAGNOSIS — Z13.220 SCREENING CHOLESTEROL LEVEL: ICD-10-CM

## 2020-09-11 DIAGNOSIS — D12.6 TUBULAR ADENOMA OF COLON: ICD-10-CM

## 2020-09-11 DIAGNOSIS — Z11.59 ENCOUNTER FOR SCREENING FOR OTHER VIRAL DISEASES: Primary | ICD-10-CM

## 2020-09-11 DIAGNOSIS — Z23 NEED FOR 23-POLYVALENT PNEUMOCOCCAL POLYSACCHARIDE VACCINE: ICD-10-CM

## 2020-09-11 DIAGNOSIS — Z00.00 ROUTINE GENERAL MEDICAL EXAMINATION AT A HEALTH CARE FACILITY: Primary | ICD-10-CM

## 2020-09-11 DIAGNOSIS — Z13.29 SCREENING FOR THYROID DISORDER: ICD-10-CM

## 2020-09-11 DIAGNOSIS — Z23 NEEDS FLU SHOT: ICD-10-CM

## 2020-09-11 DIAGNOSIS — Z71.6 ENCOUNTER FOR TOBACCO USE CESSATION COUNSELING: ICD-10-CM

## 2020-09-11 LAB
ANION GAP SERPL CALCULATED.3IONS-SCNC: 5 MMOL/L (ref 3–14)
BUN SERPL-MCNC: 14 MG/DL (ref 7–30)
CALCIUM SERPL-MCNC: 9.5 MG/DL (ref 8.5–10.1)
CHLORIDE SERPL-SCNC: 107 MMOL/L (ref 94–109)
CHOLEST SERPL-MCNC: 216 MG/DL
CO2 SERPL-SCNC: 27 MMOL/L (ref 20–32)
CREAT SERPL-MCNC: 0.91 MG/DL (ref 0.66–1.25)
GFR SERPL CREATININE-BSD FRML MDRD: >90 ML/MIN/{1.73_M2}
GLUCOSE SERPL-MCNC: 93 MG/DL (ref 70–99)
HBA1C MFR BLD: 5.5 % (ref 0–5.6)
HDLC SERPL-MCNC: 38 MG/DL
LDLC SERPL CALC-MCNC: 143 MG/DL
NONHDLC SERPL-MCNC: 178 MG/DL
POTASSIUM SERPL-SCNC: 4.2 MMOL/L (ref 3.4–5.3)
SODIUM SERPL-SCNC: 139 MMOL/L (ref 133–144)
TRIGL SERPL-MCNC: 177 MG/DL
TSH SERPL DL<=0.005 MIU/L-ACNC: 2.69 MU/L (ref 0.4–4)

## 2020-09-11 PROCEDURE — 90471 IMMUNIZATION ADMIN: CPT | Performed by: FAMILY MEDICINE

## 2020-09-11 PROCEDURE — 83036 HEMOGLOBIN GLYCOSYLATED A1C: CPT | Performed by: FAMILY MEDICINE

## 2020-09-11 PROCEDURE — 90472 IMMUNIZATION ADMIN EACH ADD: CPT | Performed by: FAMILY MEDICINE

## 2020-09-11 PROCEDURE — 99213 OFFICE O/P EST LOW 20 MIN: CPT | Mod: 25 | Performed by: FAMILY MEDICINE

## 2020-09-11 PROCEDURE — 84443 ASSAY THYROID STIM HORMONE: CPT | Performed by: FAMILY MEDICINE

## 2020-09-11 PROCEDURE — 99406 BEHAV CHNG SMOKING 3-10 MIN: CPT | Performed by: FAMILY MEDICINE

## 2020-09-11 PROCEDURE — 80048 BASIC METABOLIC PNL TOTAL CA: CPT | Performed by: FAMILY MEDICINE

## 2020-09-11 PROCEDURE — 36415 COLL VENOUS BLD VENIPUNCTURE: CPT | Performed by: FAMILY MEDICINE

## 2020-09-11 PROCEDURE — 90686 IIV4 VACC NO PRSV 0.5 ML IM: CPT | Performed by: FAMILY MEDICINE

## 2020-09-11 PROCEDURE — 80061 LIPID PANEL: CPT | Performed by: FAMILY MEDICINE

## 2020-09-11 PROCEDURE — 90732 PPSV23 VACC 2 YRS+ SUBQ/IM: CPT | Performed by: FAMILY MEDICINE

## 2020-09-11 PROCEDURE — 99395 PREV VISIT EST AGE 18-39: CPT | Mod: 25 | Performed by: FAMILY MEDICINE

## 2020-09-11 ASSESSMENT — ENCOUNTER SYMPTOMS
ABDOMINAL PAIN: 1
CONSTIPATION: 0
COUGH: 0
DIARRHEA: 0
HEMATURIA: 0
EYE PAIN: 0
FREQUENCY: 1
HEMATOCHEZIA: 0
CHILLS: 0
FEVER: 0
DIZZINESS: 0
NERVOUS/ANXIOUS: 0

## 2020-09-11 ASSESSMENT — MIFFLIN-ST. JEOR: SCORE: 2021.03

## 2020-09-14 PROBLEM — E78.5 DYSLIPIDEMIA (HIGH LDL; LOW HDL): Status: ACTIVE | Noted: 2020-09-14

## 2020-09-17 RX ORDER — SODIUM, POTASSIUM,MAG SULFATES 17.5-3.13G
1 SOLUTION, RECONSTITUTED, ORAL ORAL SEE ADMIN INSTRUCTIONS
Qty: 2 BOTTLE | Refills: 0 | Status: SHIPPED | OUTPATIENT
Start: 2020-09-17 | End: 2020-10-05

## 2020-09-17 RX ORDER — BISACODYL 5 MG/1
15 TABLET, DELAYED RELEASE ORAL SEE ADMIN INSTRUCTIONS
Qty: 3 TABLET | Refills: 0 | Status: SHIPPED | OUTPATIENT
Start: 2020-09-17 | End: 2020-10-05

## 2020-09-21 DIAGNOSIS — Z11.59 ENCOUNTER FOR SCREENING FOR OTHER VIRAL DISEASES: ICD-10-CM

## 2020-09-21 LAB
SARS-COV-2 RNA SPEC QL NAA+PROBE: NOT DETECTED
SPECIMEN SOURCE: NORMAL

## 2020-09-21 PROCEDURE — U0003 INFECTIOUS AGENT DETECTION BY NUCLEIC ACID (DNA OR RNA); SEVERE ACUTE RESPIRATORY SYNDROME CORONAVIRUS 2 (SARS-COV-2) (CORONAVIRUS DISEASE [COVID-19]), AMPLIFIED PROBE TECHNIQUE, MAKING USE OF HIGH THROUGHPUT TECHNOLOGIES AS DESCRIBED BY CMS-2020-01-R: HCPCS | Performed by: INTERNAL MEDICINE

## 2020-09-23 RX ORDER — ONDANSETRON 4 MG/1
4 TABLET, ORALLY DISINTEGRATING ORAL EVERY 6 HOURS PRN
Status: CANCELLED | OUTPATIENT
Start: 2020-09-23

## 2020-09-23 RX ORDER — NALOXONE HYDROCHLORIDE 0.4 MG/ML
.1-.4 INJECTION, SOLUTION INTRAMUSCULAR; INTRAVENOUS; SUBCUTANEOUS
Status: CANCELLED | OUTPATIENT
Start: 2020-09-23 | End: 2020-09-24

## 2020-09-23 RX ORDER — FLUMAZENIL 0.1 MG/ML
0.2 INJECTION, SOLUTION INTRAVENOUS
Status: CANCELLED | OUTPATIENT
Start: 2020-09-23 | End: 2020-09-23

## 2020-09-23 RX ORDER — ONDANSETRON 2 MG/ML
4 INJECTION INTRAMUSCULAR; INTRAVENOUS EVERY 6 HOURS PRN
Status: CANCELLED | OUTPATIENT
Start: 2020-09-23

## 2020-09-24 ENCOUNTER — HOSPITAL ENCOUNTER (OUTPATIENT)
Facility: AMBULATORY SURGERY CENTER | Age: 40
Discharge: HOME | End: 2020-09-24
Attending: INTERNAL MEDICINE | Admitting: INTERNAL MEDICINE
Payer: COMMERCIAL

## 2020-09-24 VITALS
OXYGEN SATURATION: 95 % | TEMPERATURE: 97.2 F | HEART RATE: 153 BPM | SYSTOLIC BLOOD PRESSURE: 142 MMHG | RESPIRATION RATE: 18 BRPM | DIASTOLIC BLOOD PRESSURE: 82 MMHG

## 2020-09-24 DIAGNOSIS — Z12.11 SCREEN FOR COLON CANCER: Primary | ICD-10-CM

## 2020-09-24 LAB — COLONOSCOPY: NORMAL

## 2020-09-24 PROCEDURE — G8918 PT W/O PREOP ORDER IV AB PRO: HCPCS

## 2020-09-24 PROCEDURE — 45385 COLONOSCOPY W/LESION REMOVAL: CPT

## 2020-09-24 PROCEDURE — 88305 TISSUE EXAM BY PATHOLOGIST: CPT | Performed by: INTERNAL MEDICINE

## 2020-09-24 PROCEDURE — G8907 PT DOC NO EVENTS ON DISCHARG: HCPCS

## 2020-09-24 RX ORDER — LIDOCAINE 40 MG/G
CREAM TOPICAL
Status: DISCONTINUED | OUTPATIENT
Start: 2020-09-24 | End: 2020-09-25 | Stop reason: HOSPADM

## 2020-09-24 RX ORDER — FENTANYL CITRATE 50 UG/ML
INJECTION, SOLUTION INTRAMUSCULAR; INTRAVENOUS PRN
Status: DISCONTINUED | OUTPATIENT
Start: 2020-09-24 | End: 2020-09-24 | Stop reason: HOSPADM

## 2020-09-24 RX ORDER — ONDANSETRON 2 MG/ML
4 INJECTION INTRAMUSCULAR; INTRAVENOUS
Status: DISCONTINUED | OUTPATIENT
Start: 2020-09-24 | End: 2020-09-25 | Stop reason: HOSPADM

## 2020-09-28 LAB — COPATH REPORT: NORMAL

## 2020-10-05 ENCOUNTER — OFFICE VISIT (OUTPATIENT)
Dept: UROLOGY | Facility: CLINIC | Age: 40
End: 2020-10-05
Payer: COMMERCIAL

## 2020-10-05 DIAGNOSIS — N48.89 PENILE MASS: Primary | ICD-10-CM

## 2020-10-05 PROCEDURE — 88305 TISSUE EXAM BY PATHOLOGIST: CPT | Performed by: PATHOLOGY

## 2020-10-05 PROCEDURE — 99203 OFFICE O/P NEW LOW 30 MIN: CPT | Mod: 25 | Performed by: UROLOGY

## 2020-10-05 PROCEDURE — 11420 EXC H-F-NK-SP B9+MARG 0.5/<: CPT | Performed by: UROLOGY

## 2020-10-05 NOTE — PROGRESS NOTES
S: Patient is a 39-year-old male who [facility] patient's penile [facility].  Patient has noticed a penile mass for about 2 months.  There is drainage from it from time to time.  He denies any trauma to the area.  He has no history of STDs.  He has no penile discharge.   Current Outpatient Medications   Medication Sig Dispense Refill     Cetirizine HCl (ZYRTEC PO)        loratadine (CLARITIN) 10 MG tablet Take 10 mg by mouth daily Reported on 5/23/2017 30 tablet 1     varenicline (CHANTIX ROYAL) 0.5 MG X 11 & 1 MG X 42 tablet Take 0.5 mg tab daily for 3 days, THEN 0.5 mg tab twice daily for 4 days, THEN 1 mg twice daily. 53 tablet 0     Allergies   Allergen Reactions     Penicillins Rash     Past Medical History:   Diagnosis Date     Chondromalacia, knee, left      NO ACTIVE PROBLEMS      Tubular adenoma     10 mm     Past Surgical History:   Procedure Laterality Date     APPENDECTOMY  03/12/14     ARTHROSCOPY KNEE Left 2/3/2017    Med meniscectomy, DML     BIOPSY OF SKIN LESION  1990    inner lip cyst     COLONOSCOPY N/A 6/12/2017    Procedure: COMBINED COLONOSCOPY, SINGLE OR MULTIPLE BIOPSY/POLYPECTOMY BY BIOPSY;  COLONOSCOPY with biopsy and polypectomy by snare.;  Surgeon: Venkatesh Billings MD;  Location:  GI     VASECTOMY  2014      Family History   Problem Relation Age of Onset     Heart Disease Father         PVD     Emphysema Father      Diabetes Mother      Asthma No family hx of      C.A.D. No family hx of      Hypertension No family hx of      Cerebrovascular Disease No family hx of      Breast Cancer No family hx of      Cancer - colorectal No family hx of      Prostate Cancer No family hx of      Alcohol/Drug No family hx of      Allergies No family hx of      Alzheimer Disease No family hx of      Anesthesia Reaction No family hx of      Blood Disease No family hx of      Cancer No family hx of      Connective Tissue Disorder No family hx  of      Circulatory No family hx of      Cardiovascular No family hx of      Arthritis No family hx of      Congenital Anomalies No family hx of      Depression No family hx of      Endocrine Disease No family hx of      Eye Disorder No family hx of      Gastrointestinal Disease No family hx of      Genitourinary Problems No family hx of      Gynecology No family hx of      Genetic Disorder No family hx of      Lipids No family hx of      Musculoskeletal Disorder No family hx of      Neurologic Disorder No family hx of      Obesity No family hx of      Osteoporosis No family hx of      Psychotic Disorder No family hx of      Hearing Loss No family hx of      Respiratory No family hx of      Thyroid Disease No family hx of      Social History     Socioeconomic History     Marital status: Single     Spouse name: None     Number of children: 3     Years of education: None     Highest education level: None   Occupational History     Employer: [email or web address]     Comment: x-ray tech. industrial   Social Needs     Financial resource strain: None     Food insecurity     Worry: None     Inability: None     Transportation needs     Medical: None     Non-medical: None   Tobacco Use     Smoking status: Current Every Day Smoker     Packs/day: 0.50     Years: 15.00     Pack years: 7.50     Types: Cigarettes     Last attempt to quit: 3/20/2014     Years since quittin.5     Smokeless tobacco: Never Used     Tobacco comment: <1 ppd   Substance and Sexual Activity     Alcohol use: Yes     Alcohol/week: 0.0 standard drinks     Comment: socially     Drug use: No     Sexual activity: Yes     Partners: Female     Birth control/protection: Surgical     Comment: vasectomy   Lifestyle     Physical activity     Days per week: None     Minutes per session: None     Stress: None   Relationships     Social connections     Talks on phone: None     Gets together: None     Attends Quaker service: None     Active member of club or  organization: None     Attends meetings of clubs or organizations: None     Relationship status: None     Intimate partner violence     Fear of current or ex partner: None     Emotionally abused: None     Physically abused: None     Forced sexual activity: None   Other Topics Concern     Parent/sibling w/ CABG, MI or angioplasty before 65F 55M? Yes     Comment: dad in his early 60s      Service No     Blood Transfusions No     Caffeine Concern No     Occupational Exposure No     Hobby Hazards No     Sleep Concern Yes     Stress Concern Yes     Weight Concern No     Special Diet No     Back Care No     Exercise Yes     Bike Helmet No     Comment: NA     Seat Belt Yes     Self-Exams No   Social History Narrative     None       REVIEW OF SYSTEMS  =================  C: NEGATIVE for fever, chills, change in weight  I: NEGATIVE for worrisome rashes, moles or lesions  E/M: NEGATIVE for ear, mouth and throat problems  R: NEGATIVE for significant cough or SHORTNESS OF BREATH  CV:  NEGATIVE for chest pain, palpitations or peripheral edema  GI: NEGATIVE for nausea, abdominal pain, heartburn, or change in bowel habits  NEURO: NEGATIVE numbness/weakness  : see HPI  PSYCH: NEGATIVE depression/anxiety  LYmph: no new enlarged lymph nodes  Ortho: no new trauma/movements      Physical Exam:  There were no vitals taken for this visit.   Patient is pleasant, in no acute distress, good general condition.  Heart:  negative, PMI normal  Lung: no evidence of respiratory distress    Abdomen: Soft, nondistended, non tender. No masses. No rebound or guarding.   Exam: Penis with a small 0.6 cm lesion on the shaft.  There is no erythema or discharge seen.  Testes no masses but no scrotal skin lesion.  Skin: Warm and dry.  No redness.  Neuro: grossly normal  Musculaskeletal: moving all extremities  Psych normal mood and affect  Musculoskeletal  moving all extremities  Hematologic/Lymphatic/Immunologic: normal ant/post cervical,  axillary, supraclavicular and inguinal nodes    Assessment/Plan:   Penile mass: Most likely due to recurrent infected ingrown hair.    Procedure: Patient was draped and prepped.  The area was injected with 1% lidocaine.  The mass was completely excised sharply.  The skin edges was reapproximated using 3-0 chromic suture.  There was minimal bleeding during the operation.

## 2020-10-07 LAB — COPATH REPORT: NORMAL

## 2021-09-26 ENCOUNTER — HEALTH MAINTENANCE LETTER (OUTPATIENT)
Age: 41
End: 2021-09-26

## 2021-11-21 ENCOUNTER — HEALTH MAINTENANCE LETTER (OUTPATIENT)
Age: 41
End: 2021-11-21

## 2022-02-11 ENCOUNTER — NURSE TRIAGE (OUTPATIENT)
Dept: NURSING | Facility: CLINIC | Age: 42
End: 2022-02-11
Payer: COMMERCIAL

## 2022-02-11 NOTE — TELEPHONE ENCOUNTER
"Thinks that he has veritgo    The room is spinning when he moves his head to fast    Someone saw his eyes \"shaking\"    This all started about 2 weeks ago    Walking with no assistance    Happens more when moves quickly    Numbness in his arms that started about 3-4 days ago. This comes and goes    No chest pain, not feeling sweaty    No heart palpitations    No fever    No breathing issues and not feeling short of breath    No headaches    Has had some soreness in his neck and upper back and this has been present for months    Per protocol patient should be seen today    Adrienne Hopson RN  [place] Nurse Advisor  9:57 AM  2/11/2022        Reason for Disposition    Patient wants to be seen    Additional Information    Negative: Shock suspected (e.g., cold/pale/clammy skin, too weak to stand, low BP, rapid pulse)    Negative: Difficult to awaken or acting confused (e.g., disoriented, slurred speech)    Negative: Fainted, and still feels dizzy afterwards    Negative: Severe difficulty breathing (e.g., struggling for each breath, speaks in single words)    Negative: Overdose (accidental or intentional) of medications    Negative: New neurologic deficit that is present now: * Weakness of the face, arm, or leg on one side of the body * Numbness of the face, arm, or leg on one side of the body * Loss of speech or garbled speech    Negative: Heart beating < 50 beats per minute OR > 140 beats per minute    Negative: Sounds like a life-threatening emergency to the triager    Negative: SEVERE dizziness (e.g., unable to stand, requires support to walk, feels like passing out now)    Negative: SEVERE headache or neck pain    Negative: Spinning or tilting sensation (vertigo) present now and one or more stroke risk factors (i.e., hypertension, diabetes, prior stroke/TIA, heart attack, age over 60) (Exception: prior physician evaluation for this AND no different/worse than usual)    Negative: Loss of vision or double vision    " Negative: Extra heart beats OR irregular heart beating (i.e., 'palpitations')    Negative: Difficulty breathing    Negative: Drinking very little and has signs of dehydration (e.g., no urine > 12 hours, very dry mouth, very lightheaded)    Negative: Follows bleeding (e.g., stomach, rectum, vagina) (Exception: became dizzy from sight of small amount blood)    Negative: Patient sounds very sick or weak to the triager    Negative: Lightheadedness (dizziness) present now, after 2 hours of rest and fluids    Negative: Spinning or tilting sensation (vertigo) present now    Negative: Fever > 103 F (39.4 C)    Negative: Fever > 100.0 F (37.8 C) and has diabetes mellitus or a weak immune system (e.g., HIV positive, cancer chemotherapy, organ transplant, splenectomy, chronic steroids)    Negative: Vomiting occurs with dizziness    Protocols used: DIZZINESS-A-OH

## 2022-02-15 NOTE — PROGRESS NOTES
SUBJECTIVE:   CC: Steve Vo is an 41 year old male who presents for preventative health visit.     Patient has been advised of split billing requirements and indicates understanding: Yes     Healthy Habits:     Getting at least 3 servings of Calcium per day:  Yes    Bi-annual eye exam:  Yes    Dental care twice a year:  NO    Sleep apnea or symptoms of sleep apnea:  Excessive snoring    Diet:  Regular (no restrictions)    Frequency of exercise:  1 day/week    Duration of exercise:  N/A    Taking medications regularly:  Yes    Medication side effects:  None    PHQ-2 Total Score: 0    Additional concerns today:  Yes         Today's PHQ-2 Score:   PHQ-2 (  [employer]) 2020   Q1: Little interest or pleasure in doing things 0   Q2: Feeling down, depressed or hopeless 0   PHQ-2 Score 0   PHQ-2 Total Score (12-17 Years)- Positive if 3 or more points; Administer PHQ-A if positive 0   Q1: Little interest or pleasure in doing things Not at all   Q2: Feeling down, depressed or hopeless Not at all   PHQ-2 Score 0       Abuse: Current or Past(Physical, Sexual or Emotional)- No  Do you feel safe in your environment? Yes    Have you ever done Advance Care Planning? (For example, a Health Directive, POLST, or a discussion with a medical provider or your loved ones about your wishes): No, advance care planning information given to patient to review.  Patient plans to discuss their wishes with loved ones or provider.      Social History     Tobacco Use     Smoking status: Current Every Day Smoker     Packs/day: 0.50     Years: 15.00     Pack years: 7.50     Types: Cigarettes     Last attempt to quit: 3/20/2014     Years since quittin.9     Smokeless tobacco: Never Used     Tobacco comment: <1 ppd   Substance Use Topics     Alcohol use: Yes     Alcohol/week: 0.0 standard drinks     Comment: socially         Alcohol Use 2020   Prescreen: >3 drinks/day or >7 drinks/week? No   Prescreen: >3 drinks/day or >7  drinks/week? -   AUDIT SCORE  4       Last PSA: No results found for: PSA    Reviewed orders with patient. Reviewed health maintenance and updated orders accordingly - Yes  Lab work is in process  Labs reviewed in EPIC  BP Readings from Last 3 Encounters:   02/18/22 (!) 140/80   09/24/20 (!) 142/82   09/11/20 136/86    Wt Readings from Last 3 Encounters:   02/18/22 106.6 kg (235 lb)   09/11/20 107.7 kg (237 lb 8 oz)   05/23/17 106.8 kg (235 lb 8 oz)                    Reviewed and updated as needed this visit by clinical staff                Reviewed and updated as needed this visit by Provider               Past Medical History:   Diagnosis Date     Chondromalacia, knee, left      NO ACTIVE PROBLEMS      Tubular adenoma     10 mm      Past Surgical History:   Procedure Laterality Date     APPENDECTOMY  03/12/14     ARTHROSCOPY KNEE Left 2/3/2017    Med meniscectomy, DML     BIOPSY OF SKIN LESION  1990    inner lip cyst     COLONOSCOPY N/A 6/12/2017    Procedure: COMBINED COLONOSCOPY, SINGLE OR MULTIPLE BIOPSY/POLYPECTOMY BY BIOPSY;  COLONOSCOPY with biopsy and polypectomy by snare.;  Surgeon: Venkatesh Billings MD;  Location:  GI     VASECTOMY  2014       Review of Systems   Constitutional: Negative for chills and fever.   HENT: Positive for congestion, ear pain and sore throat. Negative for hearing loss.    Eyes: Negative for pain and visual disturbance.   Respiratory: Negative for cough and shortness of breath.    Cardiovascular: Negative for chest pain, palpitations and peripheral edema.   Gastrointestinal: Positive for abdominal pain and heartburn. Negative for constipation, diarrhea, hematochezia and nausea.   Genitourinary: Negative for dysuria, frequency, genital sores, hematuria, impotence, penile discharge and urgency.   Musculoskeletal: Positive for arthralgias and myalgias. Negative for joint swelling.   Skin: Negative for rash.   Neurological: Positive for dizziness and weakness. Negative for  "headaches and paresthesias.   Psychiatric/Behavioral: Negative for mood changes. The patient is not nervous/anxious.          OBJECTIVE:   BP (!) 140/80 (BP Location: Left arm, Patient Position: Chair, Cuff Size: Adult Large)   Pulse 85   Temp 97.5  F (36.4  C) (Temporal)   Resp 18   Ht 1.797 m (5' 10.75\")   Wt 106.6 kg (235 lb)   SpO2 99%   BMI 33.01 kg/m      Physical Exam  GENERAL: healthy, alert and no distress  EYES: Eyes grossly normal to inspection, PERRL and conjunctivae and sclerae normal  HENT: ear canals and TM's normal, nose and mouth without ulcers or lesions  NECK: no adenopathy, no asymmetry, masses, or scars and thyroid normal to palpation  RESP: lungs clear to auscultation - no rales, rhonchi or wheezes  CV: regular rate and rhythm, normal S1 S2, no S3 or S4, no murmur, click or rub, no peripheral edema and peripheral pulses strong  ABDOMEN: soft, nontender, no hepatosplenomegaly, no masses and bowel sounds normal   (male): normal male genitalia without lesions or urethral discharge, no hernia  MS: no gross musculoskeletal defects noted, no edema  SKIN: no suspicious lesions or rashes  NEURO: Normal strength and tone, sensory exam grossly normal, mentation intact, oriented times 3, speech normal, cranial nerves 2-12 intact, DTR's normal and symmetric and [place]-Hallpike maneuver positive on left, symptoms resolved with modified Epley maneuver  PSYCH: mentation appears normal, affect normal/bright    Diagnostic Test Results:  Labs reviewed in Epic    ASSESSMENT/PLAN:   (Z00.00) Routine general medical examination at a health care facility  (primary encounter diagnosis)  Comment: 41-year-old male here for annual exam.  We discussed current cancer screening guidelines.  See below for other issues addressed.  He requested additional labs, this is reported to his work as part of biometric screening.  Plan: Lipid panel reflex to direct LDL Fasting, Basic        metabolic panel  (Ca, Cl, CO2, Creat, " "Gluc, K,         Na, BUN), Hemoglobin A1c            (H81.12) Benign paroxysmal positional vertigo of left ear  Comment: Left-sided, improved with modified Epley, given exercises to do at home.  Follow-up no continued improvement or any worsening.  Plan: Follow-up as needed    (Z86.010) History of colonic polyps  Comment: History of polyps, currently getting colonoscopies every 5 years, next is due in .  Plan: Continue per recommendations of gastroenterology.    Patient has been advised of split billing requirements and indicates understanding: Yes    COUNSELING:   Reviewed preventive health counseling, as reflected in patient instructions       Regular exercise       Healthy diet/nutrition       Consider Hep C screening for all patients one time for ages 18-79 years       HIV screeninx in teen years, 1x in adult years, and at intervals if high risk       Colorectal cancer screening       Prostate cancer screening    Estimated body mass index is 33.32 kg/m  as calculated from the following:    Height as of 20: 1.798 m (5' 10.79\").    Weight as of 20: 107.7 kg (237 lb 8 oz).     Weight management plan: Discussed healthy diet and exercise guidelines    He reports that he has been smoking cigarettes. He has a 7.50 pack-year smoking history. He has never used smokeless tobacco.  Tobacco Cessation Action Plan:   Will address next visit      Counseling Resources:  ATP IV Guidelines  Pooled Cohorts Equation Calculator  FRAX Risk Assessment  ICSI Preventive Guidelines  Dietary Guidelines for Americans,   USDA's MyPlate  ASA Prophylaxis  Lung CA Screening    Mil Naylor MD  [facility]  "

## 2022-02-16 ASSESSMENT — ENCOUNTER SYMPTOMS
EYE PAIN: 0
FEVER: 0
ARTHRALGIAS: 1
SHORTNESS OF BREATH: 0
DYSURIA: 0
HEADACHES: 0
JOINT SWELLING: 0
NAUSEA: 0
ABDOMINAL PAIN: 1
FREQUENCY: 0
CONSTIPATION: 0
PARESTHESIAS: 0
DIARRHEA: 0
PALPITATIONS: 0
CHILLS: 0
SORE THROAT: 1
COUGH: 0
HEMATURIA: 0
DIZZINESS: 1
HEARTBURN: 1
MYALGIAS: 1
HEMATOCHEZIA: 0
NERVOUS/ANXIOUS: 0
WEAKNESS: 1

## 2022-02-18 ENCOUNTER — OFFICE VISIT (OUTPATIENT)
Dept: FAMILY MEDICINE | Facility: CLINIC | Age: 42
End: 2022-02-18
Payer: COMMERCIAL

## 2022-02-18 VITALS
HEIGHT: 71 IN | RESPIRATION RATE: 18 BRPM | OXYGEN SATURATION: 99 % | TEMPERATURE: 97.5 F | BODY MASS INDEX: 32.9 KG/M2 | DIASTOLIC BLOOD PRESSURE: 80 MMHG | WEIGHT: 235 LBS | SYSTOLIC BLOOD PRESSURE: 140 MMHG | HEART RATE: 85 BPM

## 2022-02-18 DIAGNOSIS — H81.12 BENIGN PAROXYSMAL POSITIONAL VERTIGO OF LEFT EAR: ICD-10-CM

## 2022-02-18 DIAGNOSIS — Z00.00 ROUTINE GENERAL MEDICAL EXAMINATION AT A HEALTH CARE FACILITY: Primary | ICD-10-CM

## 2022-02-18 DIAGNOSIS — Z86.0100 HISTORY OF COLONIC POLYPS: ICD-10-CM

## 2022-02-18 LAB
ANION GAP SERPL CALCULATED.3IONS-SCNC: 3 MMOL/L (ref 3–14)
BUN SERPL-MCNC: 11 MG/DL (ref 7–30)
CALCIUM SERPL-MCNC: 9.2 MG/DL (ref 8.5–10.1)
CHLORIDE BLD-SCNC: 105 MMOL/L (ref 94–109)
CHOLEST SERPL-MCNC: 220 MG/DL
CO2 SERPL-SCNC: 30 MMOL/L (ref 20–32)
CREAT SERPL-MCNC: 0.94 MG/DL (ref 0.66–1.25)
FASTING STATUS PATIENT QL REPORTED: YES
GFR SERPL CREATININE-BSD FRML MDRD: >90 ML/MIN/1.73M2
GLUCOSE BLD-MCNC: 112 MG/DL (ref 70–99)
HBA1C MFR BLD: 5.6 % (ref 0–5.6)
HDLC SERPL-MCNC: 30 MG/DL
LDLC SERPL CALC-MCNC: 153 MG/DL
NONHDLC SERPL-MCNC: 190 MG/DL
POTASSIUM BLD-SCNC: 4.1 MMOL/L (ref 3.4–5.3)
SODIUM SERPL-SCNC: 138 MMOL/L (ref 133–144)
TRIGL SERPL-MCNC: 184 MG/DL

## 2022-02-18 PROCEDURE — 80048 BASIC METABOLIC PNL TOTAL CA: CPT | Performed by: FAMILY MEDICINE

## 2022-02-18 PROCEDURE — 83036 HEMOGLOBIN GLYCOSYLATED A1C: CPT | Performed by: FAMILY MEDICINE

## 2022-02-18 PROCEDURE — 36415 COLL VENOUS BLD VENIPUNCTURE: CPT | Performed by: FAMILY MEDICINE

## 2022-02-18 PROCEDURE — 80061 LIPID PANEL: CPT | Performed by: FAMILY MEDICINE

## 2022-02-18 PROCEDURE — 99395 PREV VISIT EST AGE 18-39: CPT | Performed by: FAMILY MEDICINE

## 2022-02-18 PROCEDURE — 99214 OFFICE O/P EST MOD 30 MIN: CPT | Mod: 25 | Performed by: FAMILY MEDICINE

## 2022-02-18 ASSESSMENT — ENCOUNTER SYMPTOMS
PARESTHESIAS: 0
NAUSEA: 0
HEARTBURN: 1
JOINT SWELLING: 0
SHORTNESS OF BREATH: 0
DIARRHEA: 0
EYE PAIN: 0
HEADACHES: 0
SORE THROAT: 1
DYSURIA: 0
HEMATURIA: 0
CHILLS: 0
NERVOUS/ANXIOUS: 0
ARTHRALGIAS: 1
FEVER: 0
WEAKNESS: 1
COUGH: 0
CONSTIPATION: 0
HEMATOCHEZIA: 0
DIZZINESS: 1
MYALGIAS: 1
ABDOMINAL PAIN: 1
FREQUENCY: 0
PALPITATIONS: 0

## 2022-02-18 ASSESSMENT — PAIN SCALES - GENERAL: PAINLEVEL: MILD PAIN (2)

## 2022-02-18 NOTE — RESULT ENCOUNTER NOTE
Steve,  Your recent studies showed elevated cholesterol, recommend improving your diet, weight loss.  A nutrition consult may be helpful, please let me know if I can order that for you.  Please let me know if you have any questions or concerns and follow up as discussed in clinic.      Sincerely.  Dr. CM Naylor MD, FAAFP  Family Medicine Physician  [facility] [place]  [id number] [place] [id number]

## 2022-02-18 NOTE — RESULT ENCOUNTER NOTE
Steve,  Your recent studies showed a normal hemoglobin A1c.  Please let me know if you have any questions or concerns and follow up as discussed in clinic.    Sincerely.  Dr. CM Naylor MD, FAAFP  Family Medicine Physician  [facility] [place]  [address] [place] [address]

## 2023-01-08 ENCOUNTER — HEALTH MAINTENANCE LETTER (OUTPATIENT)
Age: 43
End: 2023-01-08

## 2023-04-23 ENCOUNTER — HEALTH MAINTENANCE LETTER (OUTPATIENT)
Age: 43
End: 2023-04-23

## 2023-06-29 ENCOUNTER — OFFICE VISIT (OUTPATIENT)
Dept: FAMILY MEDICINE | Facility: CLINIC | Age: 43
End: 2023-06-29
Payer: COMMERCIAL

## 2023-06-29 VITALS
TEMPERATURE: 98.4 F | SYSTOLIC BLOOD PRESSURE: 122 MMHG | BODY MASS INDEX: 33.93 KG/M2 | HEART RATE: 61 BPM | HEIGHT: 70 IN | RESPIRATION RATE: 14 BRPM | OXYGEN SATURATION: 98 % | DIASTOLIC BLOOD PRESSURE: 78 MMHG | WEIGHT: 237 LBS

## 2023-06-29 DIAGNOSIS — Z23 NEED FOR VACCINATION FOR STREP PNEUMONIAE: ICD-10-CM

## 2023-06-29 DIAGNOSIS — R20.0 EXTREMITY NUMBNESS: ICD-10-CM

## 2023-06-29 DIAGNOSIS — R10.84 ABDOMINAL PAIN, GENERALIZED: ICD-10-CM

## 2023-06-29 DIAGNOSIS — Z23 NEED FOR HEPATITIS B VACCINATION: ICD-10-CM

## 2023-06-29 DIAGNOSIS — Z00.00 ROUTINE GENERAL MEDICAL EXAMINATION AT A HEALTH CARE FACILITY: Primary | ICD-10-CM

## 2023-06-29 DIAGNOSIS — E78.5 DYSLIPIDEMIA (HIGH LDL; LOW HDL): ICD-10-CM

## 2023-06-29 PROBLEM — R10.32 LLQ ABDOMINAL PAIN: Status: RESOLVED | Noted: 2017-01-30 | Resolved: 2023-06-29

## 2023-06-29 PROBLEM — J40 BRONCHITIS: Status: RESOLVED | Noted: 2017-01-30 | Resolved: 2023-06-29

## 2023-06-29 PROBLEM — Z78.9 ELECTRONIC CIGARETTE USE: Status: RESOLVED | Noted: 2017-01-30 | Resolved: 2023-06-29

## 2023-06-29 LAB
ERYTHROCYTE [DISTWIDTH] IN BLOOD BY AUTOMATED COUNT: 12.2 % (ref 10–15)
HCT VFR BLD AUTO: 45.3 % (ref 40–53)
HGB BLD-MCNC: 14.8 G/DL (ref 13.3–17.7)
MCH RBC QN AUTO: 30 PG (ref 26.5–33)
MCHC RBC AUTO-ENTMCNC: 32.7 G/DL (ref 31.5–36.5)
MCV RBC AUTO: 92 FL (ref 78–100)
PLATELET # BLD AUTO: 341 10E3/UL (ref 150–450)
RBC # BLD AUTO: 4.93 10E6/UL (ref 4.4–5.9)
T4 FREE SERPL-MCNC: 0.89 NG/DL (ref 0.9–1.7)
TSH SERPL DL<=0.005 MIU/L-ACNC: 5.9 UIU/ML (ref 0.3–4.2)
VIT B12 SERPL-MCNC: 560 PG/ML (ref 232–1245)
WBC # BLD AUTO: 7 10E3/UL (ref 4–11)

## 2023-06-29 PROCEDURE — 84207 ASSAY OF VITAMIN B-6: CPT | Mod: 90 | Performed by: FAMILY MEDICINE

## 2023-06-29 PROCEDURE — 82607 VITAMIN B-12: CPT | Performed by: FAMILY MEDICINE

## 2023-06-29 PROCEDURE — 90472 IMMUNIZATION ADMIN EACH ADD: CPT | Performed by: FAMILY MEDICINE

## 2023-06-29 PROCEDURE — 36415 COLL VENOUS BLD VENIPUNCTURE: CPT | Performed by: FAMILY MEDICINE

## 2023-06-29 PROCEDURE — 99214 OFFICE O/P EST MOD 30 MIN: CPT | Mod: 25 | Performed by: FAMILY MEDICINE

## 2023-06-29 PROCEDURE — 90677 PCV20 VACCINE IM: CPT | Performed by: FAMILY MEDICINE

## 2023-06-29 PROCEDURE — 90746 HEPB VACCINE 3 DOSE ADULT IM: CPT | Performed by: FAMILY MEDICINE

## 2023-06-29 PROCEDURE — 99000 SPECIMEN HANDLING OFFICE-LAB: CPT | Performed by: FAMILY MEDICINE

## 2023-06-29 PROCEDURE — 90471 IMMUNIZATION ADMIN: CPT | Performed by: FAMILY MEDICINE

## 2023-06-29 PROCEDURE — 85027 COMPLETE CBC AUTOMATED: CPT | Performed by: FAMILY MEDICINE

## 2023-06-29 PROCEDURE — 84443 ASSAY THYROID STIM HORMONE: CPT | Performed by: FAMILY MEDICINE

## 2023-06-29 PROCEDURE — 99396 PREV VISIT EST AGE 40-64: CPT | Mod: 25 | Performed by: FAMILY MEDICINE

## 2023-06-29 PROCEDURE — 84439 ASSAY OF FREE THYROXINE: CPT | Performed by: FAMILY MEDICINE

## 2023-06-29 ASSESSMENT — ENCOUNTER SYMPTOMS
CONSTIPATION: 0
ARTHRALGIAS: 1
PARESTHESIAS: 1
COUGH: 1
SHORTNESS OF BREATH: 0
DIZZINESS: 1
ABDOMINAL PAIN: 1
PALPITATIONS: 0
NERVOUS/ANXIOUS: 1
SORE THROAT: 0
FEVER: 0
MYALGIAS: 1
HEARTBURN: 0
JOINT SWELLING: 0
NAUSEA: 0
EYE PAIN: 1
HEMATOCHEZIA: 0
WEAKNESS: 1
FREQUENCY: 0
HEMATURIA: 0
DIARRHEA: 0
DYSURIA: 0
HEADACHES: 0
CHILLS: 0

## 2023-06-29 ASSESSMENT — PATIENT HEALTH QUESTIONNAIRE - PHQ9
SUM OF ALL RESPONSES TO PHQ QUESTIONS 1-9: 3
10. IF YOU CHECKED OFF ANY PROBLEMS, HOW DIFFICULT HAVE THESE PROBLEMS MADE IT FOR YOU TO DO YOUR WORK, TAKE CARE OF THINGS AT HOME, OR GET ALONG WITH OTHER PEOPLE: SOMEWHAT DIFFICULT
SUM OF ALL RESPONSES TO PHQ QUESTIONS 1-9: 3

## 2023-06-29 ASSESSMENT — PAIN SCALES - GENERAL: PAINLEVEL: NO PAIN (0)

## 2023-06-29 NOTE — RESULT ENCOUNTER NOTE
Please inform of results if patient has not viewed in PWA within 3 business days.    CBC Results - Your cell counts were normal.    Please call the clinic with any questions you may have.     Have a great day,    Dr. Ware

## 2023-06-29 NOTE — NURSING NOTE
Prior to immunization administration, verified patients identity using patient s name and date of birth. Please see Immunization Activity for additional information.     Screening Questionnaire for Adult Immunization    Are you sick today?   No   Do you have allergies to medications, food, a vaccine component or latex?   No   Have you ever had a serious reaction after receiving a vaccination?   No   Do you have a long-term health problem with heart, lung, kidney, or metabolic disease (e.g., diabetes), asthma, a blood disorder, no spleen, complement component deficiency, a cochlear implant, or a spinal fluid leak?  Are you on long-term aspirin therapy?   No   Do you have cancer, leukemia, HIV/AIDS, or any other immune system problem?   No   Do you have a parent, brother, or sister with an immune system problem?   No   In the past 3 months, have you taken medications that affect  your immune system, such as prednisone, other steroids, or anticancer drugs; drugs for the treatment of rheumatoid arthritis, Crohn s disease, or psoriasis; or have you had radiation treatments?   No   Have you had a seizure, or a brain or other nervous system problem?   No   During the past year, have you received a transfusion of blood or blood    products, or been given immune (gamma) globulin or antiviral drug?   No   For women: Are you pregnant or is there a chance you could become       pregnant during the next month?   No   Have you received any vaccinations in the past 4 weeks?   No     Immunization questionnaire answers were all negative.      Patient instructed to remain in clinic for 15 minutes afterwards, and to report any adverse reactions.     Screening performed by Monika Lau on 6/29/2023 at 10:42 AM.

## 2023-06-29 NOTE — PROGRESS NOTES
SUBJECTIVE:   CC: Steve is an 42 year old who presents for preventative health visit.       6/29/2023     9:20 AM   Additional Questions   Roomed by Vishal LIMA   Accompanied by n/a         6/29/2023     9:20 AM   Patient Reported Additional Medications   Patient reports taking the following new medications n/a     Healthy Habits:     Getting at least 3 servings of Calcium per day:  Yes    Bi-annual eye exam:  Yes    Dental care twice a year:  NO    Sleep apnea or symptoms of sleep apnea:  None    Diet:  Regular (no restrictions)    Frequency of exercise:  2-3 days/week    Duration of exercise:  30-45 minutes    Taking medications regularly:  Yes    Medication side effects:  None    PHQ-2 Total Score: 2    Additional concerns today:  Yes (Numbness in his fingers and toes. states that they feel cold. states its been going on for a few weeks. )    Constant numbness/cold feeling in bilateral hands and feet for 2 weeks. Radiates into left neck. Went to chiropractor and has not helped. Father has father has history of circulation issues needing stents. Mother has diabetes.    Had labs done through [facility]. Normal BG at 91. HDL 36, Total cholesterol was 191, and . Done 6/27/23.    Bloating for last couple years. Associates this from when he got his appendix out 5 years ago. No blood in stools. No nausea. No food triggers. Has tried FODMAP diet and gas x. Not terribly bothersome, but annoying. More worried about his hands and feet.    Ok with vaccinations today.    Today's PHQ-2 Score:       6/29/2023     9:11 AM   PHQ-2 ( 1999 Pfizer)   Q1: Little interest or pleasure in doing things 2   Q2: Feeling down, depressed or hopeless 1   PHQ-2 Score 3   Q1: Little interest or pleasure in doing things More than half the days   Q2: Feeling down, depressed or hopeless Several days   PHQ-2 Score 3     Social History     Tobacco Use     Smoking status: Former     Packs/day: 1.00     Years: 15.00     Pack years: 15.00      Types: Cigarettes     Quit date: 3/20/2014     Years since quittin.2     Passive exposure: Never     Smokeless tobacco: Former     Types: Chew     Tobacco comments:     <1 ppd   Substance Use Topics     Alcohol use: Yes     Alcohol/week: 0.0 standard drinks of alcohol     Comment: socially         2023     9:10 AM   Alcohol Use   Prescreen: >3 drinks/day or >7 drinks/week? No     Last PSA: No results found for: PSA    Reviewed orders with patient. Reviewed health maintenance and updated orders accordingly - Yes  Lab work is in process  BP Readings from Last 3 Encounters:   23 122/78   22 (!) 140/80   20 (!) 142/82    Wt Readings from Last 3 Encounters:   23 107.5 kg (237 lb)   22 106.6 kg (235 lb)   20 107.7 kg (237 lb 8 oz)          Patient Active Problem List   Diagnosis     CARDIOVASCULAR SCREENING; LDL GOAL LESS THAN 160     Snoring     S/P arthroscopy of left knee     Dyslipidemia (high LDL; low HDL)     Abdominal pain, generalized     Past Surgical History:   Procedure Laterality Date     APPENDECTOMY  14     ARTHROSCOPY KNEE Left 2/3/2017    Med meniscectomy, DML     BIOPSY OF SKIN LESION      inner lip cyst     COLONOSCOPY N/A 2017    Procedure: COMBINED COLONOSCOPY, SINGLE OR MULTIPLE BIOPSY/POLYPECTOMY BY BIOPSY;  COLONOSCOPY with biopsy and polypectomy by snare.;  Surgeon: Venkatesh Billings MD;  Location:  GI     VASECTOMY         Social History     Tobacco Use     Smoking status: Former     Packs/day: 1.00     Years: 15.00     Pack years: 15.00     Types: Cigarettes     Quit date: 3/20/2014     Years since quittin.2     Passive exposure: Never     Smokeless tobacco: Former     Types: Chew     Tobacco comments:     <1 ppd   Substance Use Topics     Alcohol use: Yes     Alcohol/week: 0.0 standard drinks of alcohol     Comment: socially     Family History   Problem Relation Age of Onset     Heart Disease Father         PVD     Emphysema  Father      Diabetes Mother      Genetic Disorder Mother      Asthma No family hx of      C.A.D. No family hx of      Hypertension No family hx of      Cerebrovascular Disease No family hx of      Breast Cancer No family hx of      Cancer - colorectal No family hx of      Prostate Cancer No family hx of      Alcohol/Drug No family hx of      Allergies No family hx of      Alzheimer Disease No family hx of      Anesthesia Reaction No family hx of      Blood Disease No family hx of      Cancer No family hx of      Connective Tissue Disorder No family hx of      Circulatory No family hx of      Cardiovascular No family hx of      Arthritis No family hx of      Congenital Anomalies No family hx of      Depression No family hx of      Endocrine Disease No family hx of      Eye Disorder No family hx of      Gastrointestinal Disease No family hx of      Genitourinary Problems No family hx of      Gynecology No family hx of      Genetic Disorder No family hx of      Lipids No family hx of      Musculoskeletal Disorder No family hx of      Neurologic Disorder No family hx of      Obesity No family hx of      Osteoporosis No family hx of      Psychotic Disorder No family hx of      Hearing Loss No family hx of      Respiratory No family hx of      Thyroid Disease No family hx of          Current Outpatient Medications   Medication Sig Dispense Refill     Cetirizine HCl (ZYRTEC PO)        Allergies   Allergen Reactions     Penicillins Rash       Reviewed and updated as needed this visit by clinical staff   Tobacco  Allergies  Meds  Problems  Med Hx  Surg Hx  Fam Hx          Reviewed and updated as needed this visit by Provider   Tobacco  Allergies  Meds  Problems  Med Hx  Surg Hx  Fam Hx       Social history reviewed.  Past Medical History:   Diagnosis Date     Chondromalacia, knee, left      Tubular adenoma     10 mm      Past Surgical History:   Procedure Laterality Date     APPENDECTOMY  03/12/14      "ARTHROSCOPY KNEE Left 2/3/2017    Med meniscectomy, DML     BIOPSY OF SKIN LESION  1990    inner lip cyst     COLONOSCOPY N/A 6/12/2017    Procedure: COMBINED COLONOSCOPY, SINGLE OR MULTIPLE BIOPSY/POLYPECTOMY BY BIOPSY;  COLONOSCOPY with biopsy and polypectomy by snare.;  Surgeon: Venkatesh Billings MD;  Location:  GI     VASECTOMY  2014       Review of Systems   Constitutional: Negative for chills and fever.   HENT: Positive for congestion. Negative for ear pain, hearing loss and sore throat.    Eyes: Positive for pain and visual disturbance.   Respiratory: Positive for cough. Negative for shortness of breath.    Cardiovascular: Negative for chest pain, palpitations and peripheral edema.   Gastrointestinal: Positive for abdominal pain. Negative for constipation, diarrhea, heartburn, hematochezia and nausea.   Genitourinary: Negative for dysuria, frequency, genital sores, hematuria, impotence, penile discharge and urgency.   Musculoskeletal: Positive for arthralgias and myalgias. Negative for joint swelling.   Skin: Negative for rash.   Neurological: Positive for dizziness, weakness and paresthesias. Negative for headaches.   Psychiatric/Behavioral: Positive for mood changes. The patient is nervous/anxious.        OBJECTIVE:   /78   Pulse 61   Temp 98.4  F (36.9  C) (Temporal)   Resp 14   Ht 1.78 m (5' 10.08\")   Wt 107.5 kg (237 lb)   SpO2 98%   BMI 33.93 kg/m      Physical Exam  GENERAL: healthy, alert and no distress  EYES: Eyes grossly normal to inspection, PERRL and conjunctivae and sclerae normal  HENT: ear canals and TM's normal, nose and mouth without ulcers or lesions  NECK: no adenopathy, no asymmetry, masses, or scars and thyroid normal to palpation  RESP: lungs clear to auscultation - no rales, rhonchi or wheezes  CV: regular rate and rhythm, normal S1 S2, no S3 or S4, no murmur, click or rub, no peripheral edema and peripheral pulses strong  ABDOMEN: soft, nontender, no " hepatosplenomegaly, no masses and bowel sounds normal  MS: no gross musculoskeletal defects noted, no edema  SKIN: no suspicious lesions or rashes  NEURO: CN II-XII intact. Normal strength and tone, mentation intact and speech normal  PSYCH: mentation appears normal, affect normal/bright    Labs: pending. Reviewed outside available labs today.    ASSESSMENT/PLAN:   1. Routine general medical examination at a health care facility: Discussed personal health and safety. Routine screenings as below. Appropriate anticipatory guidance, vaccinations, and health screening recommendations delivered according to the USPSTF and other appropriate society guidelines.  Patient understands and is agreeable with the plan ordered below.  - PNEUMOCOCCAL 20 VALENT CONJUGATE (PREVNAR 20)  - HEPATITIS B, ADULT (ENGERIX-B/RECOMBIVAX HB)  - VACCINE ADMINISTRATION, INITIAL  - IMMUNIATION ADMIN EACH ADDT'    2. Extremity numbness: New onset. Check B12 and B6 as well as thyroid and CBC. If persistent and labs are normal check EMG and refer to neurology. No diabetes history. No known heavy metal exposure. Consider autoimmune process. Given distribution most consistent with length dependant process. Clinically does not appear to be raynaud's.  - Vitamin B12; Future  - Vitamin B6; Future  - TSH with free T4 reflex; Future  - CBC with platelets; Future  - EMG; Future  - Vitamin B12  - Vitamin B6  - TSH with free T4 reflex  - CBC with platelets    3. Dyslipidemia (high LDL; low HDL): Reviewed outside labs. Encourage dietary and exercise changes. Not needing medication at this time.    4. Abdominal pain, generalized: Stable. No concerning sings. Discussed red flag symptoms. Consider referal to GI if requested, but not interested at this time. More worried about hand and feet symptoms.    5. Need for hepatitis B vaccination  - HEPATITIS B, ADULT (ENGERIX-B/RECOMBIVAX HB)  - VACCINE ADMINISTRATION, INITIAL    6. Need for vaccination for Strep  pneumoniae  - PNEUMOCOCCAL 20 VALENT CONJUGATE (PREVNAR 20)  - IMMUNIATION ADMIN EACH ADDT'    COUNSELING:   Reviewed preventive health counseling, as reflected in patient instructions       Regular exercise       Healthy diet/nutrition       Vision screening       Hearing screening    He reports that he quit smoking about 9 years ago. His smoking use included cigarettes. He has a 15.00 pack-year smoking history. He has never been exposed to tobacco smoke. He has quit using smokeless tobacco.  His smokeless tobacco use included chew.  Nicotine/Tobacco Cessation Plan:   Information offered: Patient not interested at this time    Jaquan Morse MD  [facility]    Disclaimer: This note consists of symbols derived from keyboarding, dictation and/or voice recognition software. As a result, there may be errors in the script that have gone undetected. Please consider this when interpreting information found in this chart.    Answers for HPI/ROS submitted by the patient on 6/29/2023  If you checked off any problems, how difficult have these problems made it for you to do your work, take care of things at home, or get along with other people?: Somewhat difficult  PHQ9 TOTAL SCORE: 3

## 2023-06-29 NOTE — PATIENT INSTRUCTIONS
FODMAP diet    Gas X      Preventive Health Recommendations  Male Ages 40 to 49    Yearly exam:             See your health care provider every year in order to  o   Review health changes.   o   Discuss preventive care.    o   Review your medicines if your doctor has prescribed any.  You should be tested each year for STDs (sexually transmitted diseases) if you re at risk.   Have a cholesterol test every 5 years.   Have a colonoscopy (test for colon cancer) if someone in your family has had colon cancer or polyps before age 50.   After age 45, have a diabetes test (fasting glucose). If you are at risk for diabetes, you should have this test every 3 years.    Talk with your health care provider about whether or not a prostate cancer screening test (PSA) is right for you.    Shots: Get a flu shot each year. Get a tetanus shot every 10 years.     Nutrition:  Eat at least 5 servings of fruits and vegetables daily.   Eat whole-grain bread, whole-wheat pasta and brown rice instead of white grains and rice.   Get adequate Calcium and Vitamin D.     Lifestyle  Exercise for at least 150 minutes a week (30 minutes a day, 5 days a week). This will help you control your weight and prevent disease.   Limit alcohol to one drink per day.   No smoking.   Wear sunscreen to prevent skin cancer.   See your dentist every six months for an exam and cleaning.

## 2023-06-30 DIAGNOSIS — E03.9 HYPOTHYROIDISM, UNSPECIFIED TYPE: Primary | ICD-10-CM

## 2023-06-30 RX ORDER — LEVOTHYROXINE SODIUM 25 UG/1
25 TABLET ORAL DAILY
Qty: 90 TABLET | Refills: 0 | Status: SHIPPED | OUTPATIENT
Start: 2023-06-30 | End: 2023-08-11

## 2023-06-30 NOTE — RESULT ENCOUNTER NOTE
Called with results. Mild TSH elevation and low T4 with cold symptoms. Will trial replacement to see if this helps with symptoms.    Rx sent. Repeat TSH in 6 weeks.    Jaquan Morse MD

## 2023-07-03 LAB — PYRIDOXAL PHOS SERPL-SCNC: 158.6 NMOL/L

## 2023-08-10 ENCOUNTER — LAB (OUTPATIENT)
Dept: LAB | Facility: CLINIC | Age: 43
End: 2023-08-10
Payer: COMMERCIAL

## 2023-08-10 DIAGNOSIS — E03.9 HYPOTHYROIDISM, UNSPECIFIED TYPE: ICD-10-CM

## 2023-08-10 LAB
T4 FREE SERPL-MCNC: 0.99 NG/DL (ref 0.9–1.7)
TSH SERPL DL<=0.005 MIU/L-ACNC: 7.08 UIU/ML (ref 0.3–4.2)

## 2023-08-10 PROCEDURE — 84439 ASSAY OF FREE THYROXINE: CPT

## 2023-08-10 PROCEDURE — 84443 ASSAY THYROID STIM HORMONE: CPT

## 2023-08-10 PROCEDURE — 36415 COLL VENOUS BLD VENIPUNCTURE: CPT

## 2023-08-11 DIAGNOSIS — E03.9 HYPOTHYROIDISM, UNSPECIFIED TYPE: Primary | ICD-10-CM

## 2023-08-11 RX ORDER — LEVOTHYROXINE SODIUM 50 UG/1
50 TABLET ORAL DAILY
Qty: 90 TABLET | Refills: 0 | Status: SHIPPED | OUTPATIENT
Start: 2023-08-11 | End: 2023-10-11

## 2023-08-11 NOTE — PROGRESS NOTES
Called with result. Only got 30 day from [facility] needs [facility]. Rx sent to [facility] in [place]. Increase dose given TSH showed no improvement. Cold intolerance improved on the medication. Recheck in 2 months.    Jaquan Morse MD

## 2023-10-11 ENCOUNTER — LAB (OUTPATIENT)
Dept: LAB | Facility: CLINIC | Age: 43
End: 2023-10-11
Attending: FAMILY MEDICINE
Payer: COMMERCIAL

## 2023-10-11 DIAGNOSIS — E03.9 HYPOTHYROIDISM, UNSPECIFIED TYPE: ICD-10-CM

## 2023-10-11 LAB — TSH SERPL DL<=0.005 MIU/L-ACNC: 3.3 UIU/ML (ref 0.3–4.2)

## 2023-10-11 PROCEDURE — 36415 COLL VENOUS BLD VENIPUNCTURE: CPT

## 2023-10-11 PROCEDURE — 84443 ASSAY THYROID STIM HORMONE: CPT

## 2023-10-11 RX ORDER — LEVOTHYROXINE SODIUM 50 UG/1
50 TABLET ORAL DAILY
Qty: 90 TABLET | Refills: 3 | Status: SHIPPED | OUTPATIENT
Start: 2023-10-11 | End: 2023-12-08

## 2023-10-11 NOTE — RESULT ENCOUNTER NOTE
Please inform of results if patient has not viewed in [employer] within 3 business days.    TSH - Your thyroid lab results were normal. Continue on your current dose of thyroid medication.    Please call the clinic with any questions you may have.     Have a great day,    Dr. Ware

## 2023-10-12 ENCOUNTER — OFFICE VISIT (OUTPATIENT)
Dept: NEUROLOGY | Facility: CLINIC | Age: 43
End: 2023-10-12
Attending: FAMILY MEDICINE
Payer: COMMERCIAL

## 2023-10-12 DIAGNOSIS — R20.0 NUMBNESS AND TINGLING OF BOTH FEET: ICD-10-CM

## 2023-10-12 DIAGNOSIS — R20.0 EXTREMITY NUMBNESS: ICD-10-CM

## 2023-10-12 DIAGNOSIS — R20.0 NUMBNESS AND TINGLING IN BOTH HANDS: Primary | ICD-10-CM

## 2023-10-12 DIAGNOSIS — R20.2 NUMBNESS AND TINGLING OF BOTH FEET: ICD-10-CM

## 2023-10-12 DIAGNOSIS — R20.2 NUMBNESS AND TINGLING IN BOTH HANDS: Primary | ICD-10-CM

## 2023-10-12 PROCEDURE — 95885 MUSC TST DONE W/NERV TST LIM: CPT | Mod: 59 | Performed by: INTERNAL MEDICINE

## 2023-10-12 PROCEDURE — 95912 NRV CNDJ TEST 11-12 STUDIES: CPT | Performed by: INTERNAL MEDICINE

## 2023-10-12 PROCEDURE — 95886 MUSC TEST DONE W/N TEST COMP: CPT | Mod: RT | Performed by: INTERNAL MEDICINE

## 2023-10-12 NOTE — LETTER
10/12/2023         RE: Steve Vo  [id number] [address] [id number]        Dear Colleague,    Thank you for referring your patient, Steve Vo, to the [facility] [place]. Please see a copy of my visit note below.                            [facility]  Electrodiagnostic Laboratory                 Department of Neurology                                                                                                         Test Date:  10/12/2023    Patient: Steve Vo :  Physician:    Sex: Male AGE: 42 Ref Phys:    ID#: [id number]   Technician:      History and Examination:  Patient is a 43 y/o male who presents for evaluation of cold/tingling sensation in the bilateral hands and feet. EMG ordered to evaluate for polyneuropathy. He also reports non-radiating neck pain and low back pains.     Techniques:  Motor conduction studies were done with surface recording electrodes. Sensory conduction studies were performed with surface electrodes, unless indicated otherwise by (n), designating the use of subdermal recording electrodes. Temperature was monitored and recorded throughout the study. Upper extremities were maintained at a temperature of 32 degrees Centigrade or higher.  EMG was done with a concentric needle electrode.     Results:  All nerve conduction studies (as indicated in the following tables) were within normal limits.      All examined muscles (as indicated in the following table) showed no evidence of electrical instability.        Interpretation:  This is a normal study. In particular, there is no electrophysiologic evidence of large fiber polyneuropathy.     Comment #1: This study is unable to evaluate for the presence of small fiber neuropathy. Clinical correlation is advised.   Comment #2: The right median-lumbrical/ulnar-interosseous comparison study was borderline abnormal, which can seen in the setting of mild right sided carpal  tunnel syndrome, but in isolation is not sufficient to make diagnosis of carpal tunnel syndrome.   ___________________________  Javier Lombardi MD          Nerve Conduction Studies  Motor Sites      Latency Amplitude Neg. Amp Diff Segment Distance Velocity Neg. Dur Neg Area Diff Temperature Comment   Site (ms) Norm (mV) Norm %  cm m/s Norm ms %  C    Left Fibular (EDB) Motor   Ankle 5.9  < 6.0 3.5 -  Ankle-EDB 8   5.9  23.2    Bel Fib Head 13.1 - 2.9 - -17.1 Bel Fib Head-Ankle 32 44  > 38 5.6 -11.9 23.2    Pop Fossa 15.2 - 3.5 - 20.7 Pop Fossa-Bel Fib Head 11 52  > 38 5.6 12.4 23.2    Right Fibular (EDB) Motor   Ankle 5.0  < 6.0 5.6 -  Ankle-EDB 8   5.5  23.1    Bel Fib Head 10.9 - 5.1 - -8.9 Bel Fib Head-Ankle 30.5 52  > 38 5.9 -0.63 23.2    Pop Fossa 13.4 - 5.4 - 5.9 Pop Fossa-Bel Fib Head 11 44  > 38 6.2 9.6 23.2    Right Median (APB) Motor   Wrist 4.0  < 4.4 10.9  > 5.0  Wrist-APB 8   5.4  23.4    Elbow 8.3 - 9.6  > 5.0 -11.9 Elbow-Wrist 22 51  > 48 5.5 -12.8 23.4    Right Median/Ulnar (Lumb-Dors Int II) Motor        Median (Lumb I)   Wrist 3.3 - 2.8 -  Wrist-Lumb I 10   6.3  23.1         Ulnar (Dorsal Int (manus))   Wrist 2.8 - 4.5 -  Wrist-Dorsal Int (manus) 10   5.3  23.1    Left Tibial (AHB) Motor   Ankle 5.3  < 6.5 14.2  > 5.0  Ankle-AHB 8   5.8  23.2    Knee 13.2 - 9.4 - -33.8 Knee-Ankle 35 44  > 38 6.2 -23.5 23.2    Right Tibial (AHB) Motor   Ankle 5.0  < 6.5 13.1  > 5.0  Ankle-AHB 8   6.0  22.8    Knee 12.0 - 12.0 - -8.4 Knee-Ankle 35 50  > 38 6.5 -3.8 22.9    Right Ulnar (ADM) Motor   Wrist 3.3  < 3.5 10.8  > 5.0  Wrist-ADM 8   4.9  23.3    Bel Elbow 6.2 - 10.8 - 0 Bel Elbow-Wrist 18 62  > 48 4.9 0.31 23.3    Abv Elbow 8.8 - 10.2 - -5.6 Abv Elbow-Bel Elbow 14.5 56  > 48 5.2 -2.2 23.3      Sensory Sites      Onset Lat Peak Lat Amp (O-P) Amp (P-P) Segment Distance Velocity Temperature Comment   Site ms ms  V Norm  V  cm m/s Norm  C    Right Median Sensory   Wrist-Dig II 2.8 3.5 36  > 10 58 Wrist-Dig II 14  50  > 48 23.3    Right Median-Ulnar Palmar Sensory        Median   Palm-Wrist 1.63 2.1 75 - 112 Palm-Wrist 8 49 - 23         Ulnar   Palm-Wrist 1.55 2.1 10 - 18 Palm-Wrist 8 52 - 23.1    Left Superficial Fibular Sensory   14 cm-Ankle 2.8 3.7 11  > 3 14 14 cm-Ankle 12.5 45  > 38 23.1    Right Superficial Fibular Sensory   14 cm-Ankle 2.8 3.7 11  > 3 13 14 cm-Ankle 12.5 45  > 38 23    Left Sural Sensory   Calf-Lat Mall 3.2 4.0 14  > 5 13 Calf-Lat Mall 14 44  > 38 23.2    Right Sural Sensory   Calf-Lat Mall 3.1 3.8 11  > 5 9 Calf-Lat Mall 14 45  > 38 23.2    Right Ulnar Sensory   Wrist-Dig V 2.4 3.2 29  > 8 46 Wrist-Dig V 12.5 52  > 48 23.3      Inter-Nerve Comparisons     Nerve 1 Value 1 Nerve 2 Value 2 Parameter Result Normal   Sensory Sites   R Median Palm-Wrist 2.1 ms R Ulnar Palm-Wrist 2.1 ms Peak Lat Diff 0.00 ms <0.40       Electromyography     Side Muscle Ins Act Fibs/PSW Fasc HF Amp Dur Poly Recrt Int Pat   Right Tib Anterior Nml None Nml 0 Nml Nml 0 Nml Nml   Right Gastroc Nml None Nml 0 Nml Nml 0 Nml Nml   Right Vastus Med Nml None Nml 0 Nml Nml 0 Nml Nml   Left Tib Anterior Nml None Nml 0 Nml Nml 0 Nml Nml   Left Gastroc Nml None Nml 0 Nml Nml 0 Nml Nml   Left Vastus Med Nml None Nml 0 Nml Nml 0 Nml Nml   Right L5 Parasp Nml None Nml 0        Left L5 Parasp Nml None Nml 0        Right Deltoid Nml None Nml 0 Nml Nml 0 Nml Nml   Right Triceps Nml None Nml 0 Nml Nml 0 Nml Nml   Right EDC Nml None Nml 0 Nml Nml 0 Nml Nml   Right FDI Nml None Nml 0 Nml Nml 0 Nml Nml   Right C7 Parasp Nml None Nml 0              NCS Waveforms:    Motor                         Sensory                                     Again, thank you for allowing me to participate in the care of your patient.        Sincerely,        Javier Lombardi MD

## 2023-10-12 NOTE — PROGRESS NOTES
[facility]  Electrodiagnostic Laboratory                 Department of Neurology                                                                                                         Test Date:  10/12/2023    Patient: Steve Vo :  Physician:    Sex: Male AGE: 42 Ref Phys:    ID#: [id number]   Technician:      History and Examination:  Patient is a 43 y/o male who presents for evaluation of cold/tingling sensation in the bilateral hands and feet. EMG ordered to evaluate for polyneuropathy. He also reports non-radiating neck pain and low back pains.     Techniques:  Motor conduction studies were done with surface recording electrodes. Sensory conduction studies were performed with surface electrodes, unless indicated otherwise by (n), designating the use of subdermal recording electrodes. Temperature was monitored and recorded throughout the study. Upper extremities were maintained at a temperature of 32 degrees Centigrade or higher.  EMG was done with a concentric needle electrode.     Results:  All nerve conduction studies (as indicated in the following tables) were within normal limits.      All examined muscles (as indicated in the following table) showed no evidence of electrical instability.        Interpretation:  This is a normal study. In particular, there is no electrophysiologic evidence of large fiber polyneuropathy.     Comment #1: This study is unable to evaluate for the presence of small fiber neuropathy. Clinical correlation is advised.   Comment #2: The right median-lumbrical/ulnar-interosseous comparison study was borderline abnormal, which can seen in the setting of mild right sided carpal tunnel syndrome, but in isolation is not sufficient to make diagnosis of carpal tunnel syndrome.   ___________________________  Javier Lombardi MD          Nerve Conduction Studies  Motor Sites      Latency Amplitude Neg. Amp Diff Segment Distance Velocity Neg. Dur Neg  Area Diff Temperature Comment   Site (ms) Norm (mV) Norm %  cm m/s Norm ms %  C    Left Fibular (EDB) Motor   Ankle 5.9  < 6.0 3.5 -  Ankle-EDB 8   5.9  23.2    Bel Fib Head 13.1 - 2.9 - -17.1 Bel Fib Head-Ankle 32 44  > 38 5.6 -11.9 23.2    Pop Fossa 15.2 - 3.5 - 20.7 Pop Fossa-Bel Fib Head 11 52  > 38 5.6 12.4 23.2    Right Fibular (EDB) Motor   Ankle 5.0  < 6.0 5.6 -  Ankle-EDB 8   5.5  23.1    Bel Fib Head 10.9 - 5.1 - -8.9 Bel Fib Head-Ankle 30.5 52  > 38 5.9 -0.63 23.2    Pop Fossa 13.4 - 5.4 - 5.9 Pop Fossa-Bel Fib Head 11 44  > 38 6.2 9.6 23.2    Right Median (APB) Motor   Wrist 4.0  < 4.4 10.9  > 5.0  Wrist-APB 8   5.4  23.4    Elbow 8.3 - 9.6  > 5.0 -11.9 Elbow-Wrist 22 51  > 48 5.5 -12.8 23.4    Right Median/Ulnar (Lumb-Dors Int II) Motor        Median (Lumb I)   Wrist 3.3 - 2.8 -  Wrist-Lumb I 10   6.3  23.1         Ulnar (Dorsal Int (manus))   Wrist 2.8 - 4.5 -  Wrist-Dorsal Int (manus) 10   5.3  23.1    Left Tibial (AHB) Motor   Ankle 5.3  < 6.5 14.2  > 5.0  Ankle-AHB 8   5.8  23.2    Knee 13.2 - 9.4 - -33.8 Knee-Ankle 35 44  > 38 6.2 -23.5 23.2    Right Tibial (AHB) Motor   Ankle 5.0  < 6.5 13.1  > 5.0  Ankle-AHB 8   6.0  22.8    Knee 12.0 - 12.0 - -8.4 Knee-Ankle 35 50  > 38 6.5 -3.8 22.9    Right Ulnar (ADM) Motor   Wrist 3.3  < 3.5 10.8  > 5.0  Wrist-ADM 8   4.9  23.3    Bel Elbow 6.2 - 10.8 - 0 Bel Elbow-Wrist 18 62  > 48 4.9 0.31 23.3    Abv Elbow 8.8 - 10.2 - -5.6 Abv Elbow-Bel Elbow 14.5 56  > 48 5.2 -2.2 23.3      Sensory Sites      Onset Lat Peak Lat Amp (O-P) Amp (P-P) Segment Distance Velocity Temperature Comment   Site ms ms  V Norm  V  cm m/s Norm  C    Right Median Sensory   Wrist-Dig II 2.8 3.5 36  > 10 58 Wrist-Dig II 14 50  > 48 23.3    Right Median-Ulnar Palmar Sensory        Median   Palm-Wrist 1.63 2.1 75 - 112 Palm-Wrist 8 49 - 23         Ulnar   Palm-Wrist 1.55 2.1 10 - 18 Palm-Wrist 8 52 - 23.1    Left Superficial Fibular Sensory   14 cm-Ankle 2.8 3.7 11  > 3 14 14 cm-Ankle 12.5  45  > 38 23.1    Right Superficial Fibular Sensory   14 cm-Ankle 2.8 3.7 11  > 3 13 14 cm-Ankle 12.5 45  > 38 23    Left Sural Sensory   Calf-Lat Mall 3.2 4.0 14  > 5 13 Calf-Lat Mall 14 44  > 38 23.2    Right Sural Sensory   Calf-Lat Mall 3.1 3.8 11  > 5 9 Calf-Lat Mall 14 45  > 38 23.2    Right Ulnar Sensory   Wrist-Dig V 2.4 3.2 29  > 8 46 Wrist-Dig V 12.5 52  > 48 23.3      Inter-Nerve Comparisons     Nerve 1 Value 1 Nerve 2 Value 2 Parameter Result Normal   Sensory Sites   R Median Palm-Wrist 2.1 ms R Ulnar Palm-Wrist 2.1 ms Peak Lat Diff 0.00 ms <0.40       Electromyography     Side Muscle Ins Act Fibs/PSW Fasc HF Amp Dur Poly Recrt Int Pat   Right Tib Anterior Nml None Nml 0 Nml Nml 0 Nml Nml   Right Gastroc Nml None Nml 0 Nml Nml 0 Nml Nml   Right Vastus Med Nml None Nml 0 Nml Nml 0 Nml Nml   Left Tib Anterior Nml None Nml 0 Nml Nml 0 Nml Nml   Left Gastroc Nml None Nml 0 Nml Nml 0 Nml Nml   Left Vastus Med Nml None Nml 0 Nml Nml 0 Nml Nml   Right L5 Parasp Nml None Nml 0        Left L5 Parasp Nml None Nml 0        Right Deltoid Nml None Nml 0 Nml Nml 0 Nml Nml   Right Triceps Nml None Nml 0 Nml Nml 0 Nml Nml   Right EDC Nml None Nml 0 Nml Nml 0 Nml Nml   Right FDI Nml None Nml 0 Nml Nml 0 Nml Nml   Right C7 Parasp Nml None Nml 0              NCS Waveforms:    Motor                         Sensory

## 2023-10-13 NOTE — RESULT ENCOUNTER NOTE
Please inform of results if patient has not viewed in [employer] within 3 business days.    Your EMG (nerve conduction) test was normal.    Please call the clinic with any questions you may have.     Have a great day,    Dr. Ware

## 2023-11-09 ENCOUNTER — PATIENT OUTREACH (OUTPATIENT)
Dept: GASTROENTEROLOGY | Facility: CLINIC | Age: 43
End: 2023-11-09
Payer: COMMERCIAL

## 2023-12-07 ENCOUNTER — OFFICE VISIT (OUTPATIENT)
Dept: FAMILY MEDICINE | Facility: CLINIC | Age: 43
End: 2023-12-07
Payer: COMMERCIAL

## 2023-12-07 VITALS
HEART RATE: 67 BPM | RESPIRATION RATE: 20 BRPM | WEIGHT: 234 LBS | TEMPERATURE: 98.8 F | BODY MASS INDEX: 33.5 KG/M2 | OXYGEN SATURATION: 97 % | SYSTOLIC BLOOD PRESSURE: 138 MMHG | DIASTOLIC BLOOD PRESSURE: 82 MMHG | HEIGHT: 70 IN

## 2023-12-07 DIAGNOSIS — R20.9 COLD EXTREMITIES: Primary | ICD-10-CM

## 2023-12-07 DIAGNOSIS — E03.9 HYPOTHYROIDISM, UNSPECIFIED TYPE: ICD-10-CM

## 2023-12-07 LAB
ALBUMIN SERPL BCG-MCNC: 4.3 G/DL (ref 3.5–5.2)
ALP SERPL-CCNC: 41 U/L (ref 40–150)
ALT SERPL W P-5'-P-CCNC: 30 U/L (ref 0–70)
ANION GAP SERPL CALCULATED.3IONS-SCNC: 8 MMOL/L (ref 7–15)
AST SERPL W P-5'-P-CCNC: 25 U/L (ref 0–45)
BILIRUB SERPL-MCNC: 0.2 MG/DL
BUN SERPL-MCNC: 12.6 MG/DL (ref 6–20)
CALCIUM SERPL-MCNC: 9.6 MG/DL (ref 8.6–10)
CHLORIDE SERPL-SCNC: 104 MMOL/L (ref 98–107)
CREAT SERPL-MCNC: 0.93 MG/DL (ref 0.67–1.17)
DEPRECATED HCO3 PLAS-SCNC: 27 MMOL/L (ref 22–29)
EGFRCR SERPLBLD CKD-EPI 2021: >90 ML/MIN/1.73M2
GLUCOSE SERPL-MCNC: 104 MG/DL (ref 70–99)
POTASSIUM SERPL-SCNC: 4.7 MMOL/L (ref 3.4–5.3)
PROT SERPL-MCNC: 7.2 G/DL (ref 6.4–8.3)
SODIUM SERPL-SCNC: 139 MMOL/L (ref 135–145)
TSH SERPL DL<=0.005 MIU/L-ACNC: 2.63 UIU/ML (ref 0.3–4.2)
VIT B12 SERPL-MCNC: 611 PG/ML (ref 232–1245)

## 2023-12-07 PROCEDURE — 36415 COLL VENOUS BLD VENIPUNCTURE: CPT | Performed by: FAMILY MEDICINE

## 2023-12-07 PROCEDURE — 99214 OFFICE O/P EST MOD 30 MIN: CPT | Performed by: FAMILY MEDICINE

## 2023-12-07 PROCEDURE — 80053 COMPREHEN METABOLIC PANEL: CPT | Performed by: FAMILY MEDICINE

## 2023-12-07 PROCEDURE — 84443 ASSAY THYROID STIM HORMONE: CPT | Performed by: FAMILY MEDICINE

## 2023-12-07 PROCEDURE — 82607 VITAMIN B-12: CPT | Performed by: FAMILY MEDICINE

## 2023-12-07 ASSESSMENT — PAIN SCALES - GENERAL: PAINLEVEL: NO PAIN (0)

## 2023-12-07 NOTE — PROGRESS NOTES
Assessment & Plan   1. Cold extremities  Check TERRENCE's but expect them to be normal. Check CMP, B12. Increase thyroid dosing as below to get TSH closer to 1. Consider trial of CCB for consideration of raynaud's.  - Comprehensive metabolic panel (BMP + Alb, Alk Phos, ALT, AST, Total. Bili, TP); Future  - TSH with free T4 reflex; Future  - Vitamin B12; Future  - US TERRENCE Doppler No Exercise; Future  - Comprehensive metabolic panel (BMP + Alb, Alk Phos, ALT, AST, Total. Bili, TP)  - TSH with free T4 reflex  - Vitamin B12    2. Hypothyroidism, unspecified type  Plan as above. Increase dose from 50 to 75 mcg. Recheck TSH in 2 months.   - TSH with free T4 reflex; Future  - levothyroxine (SYNTHROID/LEVOTHROID) 75 MCG tablet; Take 1 tablet (75 mcg) by mouth daily  Dispense: 90 tablet; Refill: 0       Follow-up Visit   Expected date:  Feb 08, 2024 (Approximate)      Follow Up Appointment Details:     Follow-up with whom?: Other Primary Care Services    Follow-Up for what?: Lab Visit    How?: In Person                   Jaquan Morse MD  [facility]    Disclaimer: This note consists of symbols derived from keyboarding, dictation and/or voice recognition software. As a result, there may be errors in the script that have gone undetected. Please consider this when interpreting information found in this chart.    Magdy Wilson is a 43 year old, presenting for the following health issues:  Foot Problems and Back Pain        12/7/2023     8:35 AM   Additional Questions   Roomed by Eliane COLON   Accompanied by None         12/7/2023     8:35 AM   Patient Reported Additional Medications   Patient reports taking the following new medications NA     History of Present Illness       Back Pain:  He presents for follow up of back pain. Patient's back pain is a recurring problem.  Location of back pain:  Right middle of back, left middle of back, right upper back, right side of neck and right  "hip  Description of back pain: dull ache  Back pain spreads: nowhere    Since last visit, how has back pain changed: unchanged  Since patient first noticed back pain, pain is: unchanged  Does back pain interfere with his job:  No  Has the patient tried any new treatments:  Yes If Yes, which: acetaminophen (Tylenol), Chiropractor and NSAIDS (Ibuprofen, Naproxen)      Reason for visit:  Cold feet and back pain    He eats 2-3 servings of fruits and vegetables daily.He consumes 2 sweetened beverage(s) daily.He exercises with enough effort to increase his heart rate 30 to 60 minutes per day.  He exercises with enough effort to increase his heart rate 4 days per week. He is missing 1 dose(s) of medications per week.  He is not taking prescribed medications regularly due to remembering to take.   Ongoing cold extremity symptoms. Present since he was seen in June. No improvement thus far with treatment of hypothyroidism.    No change in color. No loss of sensation. No obvious triggers.     Normal EMG    Review of Systems   Constitutional, HEENT, cardiovascular, pulmonary, GI, , musculoskeletal, neuro, skin, endocrine and psych systems are negative, except as otherwise noted.      Objective    /82 (BP Location: Right arm, Patient Position: Chair, Cuff Size: Adult Regular)   Pulse 67   Temp 98.8  F (37.1  C) (Temporal)   Resp 20   Ht 1.783 m (5' 10.2\")   Wt 106.1 kg (234 lb)   SpO2 97%   BMI 33.39 kg/m    Body mass index is 33.39 kg/m .  Physical Exam   General: Appears well and in no acute distress.  Cardiovascular: Regular rate and rhythm, normal S1 and S2 without murmur. No extra heartsounds or friction rub.  Respiratory: Lungs clear to auscultation bilaterally. No wheezing or crackles.  Musculoskeletal: No gross extremity deformities. No peripheral edema. Normal muscle bulk.    Labs: pending          "

## 2023-12-08 RX ORDER — LEVOTHYROXINE SODIUM 75 UG/1
75 TABLET ORAL DAILY
Qty: 90 TABLET | Refills: 0 | Status: SHIPPED | OUTPATIENT
Start: 2023-12-08 | End: 2024-03-14

## 2023-12-08 NOTE — RESULT ENCOUNTER NOTE
Please inform of results if patient has not viewed in [employer] within 3 business days.    CMP Results - Your blood sugar was 104. Your kidney function, electrolytes, and liver function were normal.    Your thyroid test is normal, but as discussed, it may be better to get this TSH lab closer to 1 given your symptoms. Lets increase your dose to 75 mcg of levothyroxine and recheck in 2 months with a lab only visit.    Your B12 level is normal.    Continue with the plan to obtain the doppler ultrasound.      Please call the clinic with any questions you may have.     Have a great day,    Dr. Ware

## 2023-12-14 ENCOUNTER — ANCILLARY PROCEDURE (OUTPATIENT)
Dept: ULTRASOUND IMAGING | Facility: CLINIC | Age: 43
End: 2023-12-14
Attending: FAMILY MEDICINE
Payer: COMMERCIAL

## 2023-12-14 DIAGNOSIS — R20.9 COLD EXTREMITIES: ICD-10-CM

## 2023-12-14 PROCEDURE — 93922 UPR/L XTREMITY ART 2 LEVELS: CPT | Mod: GC | Performed by: RADIOLOGY

## 2023-12-15 NOTE — RESULT ENCOUNTER NOTE
Please inform of results if patient has not viewed in [employer] within 3 business days.    Your arms and legs appear to have good blood flow.    Please call the clinic with any questions you may have.     Have a great day,    Dr. Ware

## 2024-02-08 ENCOUNTER — LAB (OUTPATIENT)
Dept: LAB | Facility: CLINIC | Age: 44
End: 2024-02-08
Attending: FAMILY MEDICINE
Payer: COMMERCIAL

## 2024-02-08 DIAGNOSIS — Z13.220 SCREENING FOR HYPERLIPIDEMIA: Primary | ICD-10-CM

## 2024-02-08 DIAGNOSIS — E03.9 HYPOTHYROIDISM, UNSPECIFIED TYPE: ICD-10-CM

## 2024-02-08 LAB
CHOLEST SERPL-MCNC: 186 MG/DL
FASTING STATUS PATIENT QL REPORTED: YES
HDLC SERPL-MCNC: 37 MG/DL
LDLC SERPL CALC-MCNC: 132 MG/DL
NONHDLC SERPL-MCNC: 149 MG/DL
TRIGL SERPL-MCNC: 87 MG/DL
TSH SERPL DL<=0.005 MIU/L-ACNC: 3.48 UIU/ML (ref 0.3–4.2)

## 2024-02-08 PROCEDURE — 84443 ASSAY THYROID STIM HORMONE: CPT

## 2024-02-08 PROCEDURE — 36415 COLL VENOUS BLD VENIPUNCTURE: CPT

## 2024-02-08 PROCEDURE — 80061 LIPID PANEL: CPT

## 2024-02-09 NOTE — RESULT ENCOUNTER NOTE
"Cas Wilson,    If you have not viewed these results on [employer] within 3 days, we will use an alternative method to contact you. We will contact you via the following protocol:    - Via letter if your results are normal.  - Via phone ([phone]) if your results are abnormal.     Here are my comments about your recent results:    Lipids - Your \"bad\" cholesterol was elevated. This can be improved with diet and exercise. All animal based foods such as meat, dairy, and eggs contain cholesterol. All plant based foods such as fruits, nuts, and vegetables do not.    You do not need a medication for this at this time.    The 10-year ASCVD risk score (Phi DK, et al., 2019) is: 1.9%    Values used to calculate the score:      Age: 43 years      Sex: Male      Is Non- : No      Diabetic: No      Tobacco smoker: No      Systolic Blood Pressure: 138 mmHg      Is BP treated: No      HDL Cholesterol: 37 mg/dL      Total Cholesterol: 186 mg/dL    TSH - Your thyroid lab results were normal.      Please call the clinic ([phone]), or message us on [employer] with any questions you may have.     Have a great day,    Dr. Ware"

## 2024-03-14 DIAGNOSIS — E03.9 HYPOTHYROIDISM, UNSPECIFIED TYPE: ICD-10-CM

## 2024-03-14 RX ORDER — LEVOTHYROXINE SODIUM 75 UG/1
75 TABLET ORAL DAILY
Qty: 90 TABLET | Refills: 2 | Status: SHIPPED | OUTPATIENT
Start: 2024-03-14 | End: 2024-08-22

## 2024-05-30 ENCOUNTER — PATIENT OUTREACH (OUTPATIENT)
Dept: CARE COORDINATION | Facility: CLINIC | Age: 44
End: 2024-05-30
Payer: COMMERCIAL

## 2024-06-13 ENCOUNTER — PATIENT OUTREACH (OUTPATIENT)
Dept: CARE COORDINATION | Facility: CLINIC | Age: 44
End: 2024-06-13
Payer: COMMERCIAL

## 2024-08-21 SDOH — HEALTH STABILITY: PHYSICAL HEALTH: ON AVERAGE, HOW MANY DAYS PER WEEK DO YOU ENGAGE IN MODERATE TO STRENUOUS EXERCISE (LIKE A BRISK WALK)?: 3 DAYS

## 2024-08-21 SDOH — HEALTH STABILITY: PHYSICAL HEALTH: ON AVERAGE, HOW MANY MINUTES DO YOU ENGAGE IN EXERCISE AT THIS LEVEL?: 30 MIN

## 2024-08-21 ASSESSMENT — SOCIAL DETERMINANTS OF HEALTH (SDOH): HOW OFTEN DO YOU GET TOGETHER WITH FRIENDS OR RELATIVES?: ONCE A WEEK

## 2024-08-22 ENCOUNTER — OFFICE VISIT (OUTPATIENT)
Dept: FAMILY MEDICINE | Facility: CLINIC | Age: 44
End: 2024-08-22
Payer: COMMERCIAL

## 2024-08-22 VITALS
HEIGHT: 70 IN | SYSTOLIC BLOOD PRESSURE: 116 MMHG | WEIGHT: 245 LBS | HEART RATE: 76 BPM | DIASTOLIC BLOOD PRESSURE: 76 MMHG | TEMPERATURE: 97.3 F | BODY MASS INDEX: 35.07 KG/M2

## 2024-08-22 DIAGNOSIS — E03.9 HYPOTHYROIDISM, UNSPECIFIED TYPE: ICD-10-CM

## 2024-08-22 DIAGNOSIS — R39.9 LOWER URINARY TRACT SYMPTOMS (LUTS): ICD-10-CM

## 2024-08-22 DIAGNOSIS — Z23 NEED FOR HEPATITIS B VACCINATION: ICD-10-CM

## 2024-08-22 DIAGNOSIS — R10.32 LLQ ABDOMINAL PAIN: ICD-10-CM

## 2024-08-22 DIAGNOSIS — Z00.00 ROUTINE GENERAL MEDICAL EXAMINATION AT A HEALTH CARE FACILITY: Primary | ICD-10-CM

## 2024-08-22 DIAGNOSIS — Z23 NEED FOR TDAP VACCINATION: ICD-10-CM

## 2024-08-22 LAB
ALBUMIN SERPL BCG-MCNC: 4.3 G/DL (ref 3.5–5.2)
ALBUMIN UR-MCNC: NEGATIVE MG/DL
ALP SERPL-CCNC: 43 U/L (ref 40–150)
ALT SERPL W P-5'-P-CCNC: 30 U/L (ref 0–70)
ANION GAP SERPL CALCULATED.3IONS-SCNC: 11 MMOL/L (ref 7–15)
APPEARANCE UR: CLEAR
AST SERPL W P-5'-P-CCNC: 22 U/L (ref 0–45)
BACTERIA #/AREA URNS HPF: ABNORMAL /HPF
BILIRUB SERPL-MCNC: 0.3 MG/DL
BILIRUB UR QL STRIP: NEGATIVE
BUN SERPL-MCNC: 9.7 MG/DL (ref 6–20)
CALCIUM SERPL-MCNC: 9.5 MG/DL (ref 8.8–10.4)
CHLORIDE SERPL-SCNC: 105 MMOL/L (ref 98–107)
COLOR UR AUTO: YELLOW
CREAT SERPL-MCNC: 0.95 MG/DL (ref 0.67–1.17)
EGFRCR SERPLBLD CKD-EPI 2021: >90 ML/MIN/1.73M2
ERYTHROCYTE [DISTWIDTH] IN BLOOD BY AUTOMATED COUNT: 12.3 % (ref 10–15)
GLUCOSE SERPL-MCNC: 107 MG/DL (ref 70–99)
GLUCOSE UR STRIP-MCNC: NEGATIVE MG/DL
HBA1C MFR BLD: 5.5 % (ref 0–5.6)
HCO3 SERPL-SCNC: 26 MMOL/L (ref 22–29)
HCT VFR BLD AUTO: 42 % (ref 40–53)
HGB BLD-MCNC: 14.1 G/DL (ref 13.3–17.7)
HGB UR QL STRIP: NEGATIVE
KETONES UR STRIP-MCNC: NEGATIVE MG/DL
LEUKOCYTE ESTERASE UR QL STRIP: NEGATIVE
MAGNESIUM SERPL-MCNC: 1.9 MG/DL (ref 1.7–2.3)
MCH RBC QN AUTO: 30.7 PG (ref 26.5–33)
MCHC RBC AUTO-ENTMCNC: 33.6 G/DL (ref 31.5–36.5)
MCV RBC AUTO: 91 FL (ref 78–100)
MUCOUS THREADS #/AREA URNS LPF: PRESENT /LPF
NITRATE UR QL: NEGATIVE
PH UR STRIP: 6 [PH] (ref 5–7)
PLATELET # BLD AUTO: 335 10E3/UL (ref 150–450)
POTASSIUM SERPL-SCNC: 4.4 MMOL/L (ref 3.4–5.3)
PROT SERPL-MCNC: 7.3 G/DL (ref 6.4–8.3)
RBC # BLD AUTO: 4.6 10E6/UL (ref 4.4–5.9)
RBC #/AREA URNS AUTO: ABNORMAL /HPF
SODIUM SERPL-SCNC: 142 MMOL/L (ref 135–145)
SP GR UR STRIP: >=1.03 (ref 1–1.03)
SQUAMOUS #/AREA URNS AUTO: ABNORMAL /LPF
T4 FREE SERPL-MCNC: 1.35 NG/DL (ref 0.9–1.7)
TSH SERPL DL<=0.005 MIU/L-ACNC: 4.6 UIU/ML (ref 0.3–4.2)
UROBILINOGEN UR STRIP-ACNC: 0.2 E.U./DL
WBC # BLD AUTO: 7.8 10E3/UL (ref 4–11)
WBC #/AREA URNS AUTO: ABNORMAL /HPF

## 2024-08-22 PROCEDURE — 81001 URINALYSIS AUTO W/SCOPE: CPT | Performed by: FAMILY MEDICINE

## 2024-08-22 PROCEDURE — 90472 IMMUNIZATION ADMIN EACH ADD: CPT | Performed by: FAMILY MEDICINE

## 2024-08-22 PROCEDURE — 83036 HEMOGLOBIN GLYCOSYLATED A1C: CPT | Performed by: FAMILY MEDICINE

## 2024-08-22 PROCEDURE — 80053 COMPREHEN METABOLIC PANEL: CPT | Performed by: FAMILY MEDICINE

## 2024-08-22 PROCEDURE — 84439 ASSAY OF FREE THYROXINE: CPT | Performed by: FAMILY MEDICINE

## 2024-08-22 PROCEDURE — 36415 COLL VENOUS BLD VENIPUNCTURE: CPT | Performed by: FAMILY MEDICINE

## 2024-08-22 PROCEDURE — 85027 COMPLETE CBC AUTOMATED: CPT | Performed by: FAMILY MEDICINE

## 2024-08-22 PROCEDURE — 90471 IMMUNIZATION ADMIN: CPT | Performed by: FAMILY MEDICINE

## 2024-08-22 PROCEDURE — 90746 HEPB VACCINE 3 DOSE ADULT IM: CPT | Performed by: FAMILY MEDICINE

## 2024-08-22 PROCEDURE — 90715 TDAP VACCINE 7 YRS/> IM: CPT | Performed by: FAMILY MEDICINE

## 2024-08-22 PROCEDURE — 83735 ASSAY OF MAGNESIUM: CPT | Performed by: FAMILY MEDICINE

## 2024-08-22 PROCEDURE — 84443 ASSAY THYROID STIM HORMONE: CPT | Performed by: FAMILY MEDICINE

## 2024-08-22 PROCEDURE — 99396 PREV VISIT EST AGE 40-64: CPT | Mod: 25 | Performed by: FAMILY MEDICINE

## 2024-08-22 PROCEDURE — 99214 OFFICE O/P EST MOD 30 MIN: CPT | Mod: 25 | Performed by: FAMILY MEDICINE

## 2024-08-22 RX ORDER — LEVOTHYROXINE SODIUM 75 UG/1
75 TABLET ORAL DAILY
Qty: 90 TABLET | Refills: 3 | Status: SHIPPED | OUTPATIENT
Start: 2024-08-22

## 2024-08-22 ASSESSMENT — PAIN SCALES - GENERAL: PAINLEVEL: NO PAIN (1)

## 2024-08-22 NOTE — PROGRESS NOTES
Preventive Care Visit  [facility] RONEY Morse MD, Family Medicine  Aug 22, 2024    Assessment & Plan   1. Routine general medical examination at a health care facility  Discussed personal health and safety. Routine screenings ordered as below. Appropriate anticipatory guidance, vaccinations, and health screening recommendations delivered according to the USPSTF and other appropriate society guidelines. Steve reports understanding and in agreement with this mutually agreed upon plan.    Today's Orders:  - TDAP 10-64Y (ADACEL,BOOSTRIX)  - REVIEW OF HEALTH MAINTENANCE PROTOCOL ORDERS  - Comprehensive metabolic panel (BMP + Alb, Alk Phos, ALT, AST, Total. Bili, TP); Future  - CBC with platelets; Future  - Magnesium; Future    2. Hypothyroidism, unspecified type  Symptoms stable/controlled. Medication monitoring labs ordered/reviewed. Tolerating pharmacotherapy well. Continue current regiment. Medication refilled per orders as below.   - levothyroxine (SYNTHROID/LEVOTHROID) 75 MCG tablet; Take 1 tablet (75 mcg) by mouth daily.  Dispense: 90 tablet; Refill: 3  - TSH with free T4 reflex; Future    3. LLQ abdominal pain  No evidence of diverticular disease on colonoscopy from 2020. Symptoms ongoing for ~10 years off and on since his appendectomy. LLQ and sometimes LUQ abdominal pain. Check CT and UA and if no obvious cause will have patient referred to GI.  - CT Abdomen Pelvis w Contrast; Future  - Adult GI  Referral - Consult Only; Future    4. Lower urinary tract symptoms (LUTS)  Decreased urinary frequency. Not terribly bothersome, given LLQ pain will check UA.  - UA with Microscopic reflex to Culture - lab collect; Future    5. Need for hepatitis B vaccination  - HEPATITIS B VACCINE (20 YEARS AND OLDER) (ENGERIX-B/RECOMBIVAX)    6. Need for Tdap vaccination  - TDAP 10-64Y (ADACEL,BOOSTRIX)       Follow-up Visit   Expected date:  Aug 22, 2025 (Approximate)      Follow Up  Appointment Details:     Follow-up with whom?: PCP    Follow-Up for what?: Adult Preventive    How?: In Person                   Jaquan Morse MD  [facility]    Disclaimer: This note consists of symbols derived from keyboarding, dictation and/or voice recognition software. As a result, there may be errors in the script that have gone undetected. Please consider this when interpreting information found in this chart.    Magdy Wilson is a 43 year old, presenting for the following:  Physical        8/22/2024     7:45 AM   Additional Questions   Roomed by YK   Accompanied by self        Health Care Directive  Patient does not have a Health Care Directive or Living Will: Discussed advance care planning with patient; however, patient declined at this time.    HPI  Ongoing cramping abdominal pain since his appendectomy in LLQ and sometimes LUQ. No change with foods of BMs. Not changed with adding or removing fiber from his diet. No blood in stools. Last colonoscopy was 2020 recommending 5 year follow-up. No imaging of his abdomen. Has noticed decreased urinary frequency overall.     Not missing his thyroid medication.    Off cigarettes, but on e-cigs. Not interested in changing anything at this time. Did not due well historically with chantix.    Ok with tdap and hep b shots.    Still worried about ALS. Had normal EMG. Will notice occasional upper body, mainly left arm fasciculations. No pain. Story changed during interview from his legs and arms, to just his arms.        8/21/2024   General Health   How would you rate your overall physical health? (!) FAIR   Feel stress (tense, anxious, or unable to sleep) To some extent      (!) STRESS CONCERN      8/21/2024   Nutrition   Three or more servings of calcium each day? Yes   Diet: Regular (no restrictions)   How many servings of fruit and vegetables per day? (!) 0-1   How many sweetened beverages each day? (!) 2             8/21/2024   Exercise   Days per week of moderate/strenous exercise 3 days   Average minutes spent exercising at this level 30 min            8/21/2024   Social Factors   Frequency of gathering with friends or relatives Once a week   Worry food won't last until get money to buy more No   Food not last or not have enough money for food? No   Do you have housing? (Housing is defined as stable permanent housing and does not include staying ouside in a car, in a tent, in an abandoned building, in an overnight shelter, or couch-surfing.) Yes   Are you worried about losing your housing? No   Lack of transportation? No   Unable to get utilities (heat,electricity)? No            8/21/2024   Dental   Dentist two times every year? (!) NO            8/21/2024   TB Screening   Were you born outside of the [place]? No            Today's PHQ-2 Score:       8/21/2024    11:30 AM   PHQ-2 ( 1999 Pfizer)   Q1: Little interest or pleasure in doing things 1   Q2: Feeling down, depressed or hopeless 1   PHQ-2 Score 2   Q1: Little interest or pleasure in doing things Several days   Q2: Feeling down, depressed or hopeless Several days   PHQ-2 Score 2           8/21/2024   Substance Use   Alcohol more than 3/day or more than 7/wk No   Do you use any other substances recreationally? No        Social History     Tobacco Use    Smoking status: Former     Current packs/day: 0.00     Average packs/day: 1 pack/day for 15.0 years (15.0 ttl pk-yrs)     Types: Cigarettes     Start date: 3/20/1999     Quit date: 3/20/2014     Years since quitting: 10.4     Passive exposure: Never    Smokeless tobacco: Current     Types: Chew    Tobacco comments:     <1 ppd   Vaping Use    Vaping status: Every Day    Substances: Nicotine    Devices: Refillable tank   Substance Use Topics    Alcohol use: Yes     Comment: socially    Drug use: No           8/21/2024   STI Screening   New sexual partner(s) since last STI/HIV test? No      ASCVD Risk   The 10-year  "ASCVD risk score (Phi GAMING, et al., 2019) is: 1.4%    Values used to calculate the score:      Age: 43 years      Sex: Male      Is Non- : No      Diabetic: No      Tobacco smoker: No      Systolic Blood Pressure: 116 mmHg      Is BP treated: No      HDL Cholesterol: 37 mg/dL      Total Cholesterol: 186 mg/dL    Reviewed and updated as needed this visit by Provider   Tobacco  Allergies  Meds  Problems  Med Hx  Surg Hx  Fam Hx        Social Hx Reviewed    Past Medical History:   Diagnosis Date    Chondromalacia, knee, left     Tubular adenoma     10 mm     Past Surgical History:   Procedure Laterality Date    ABDOMEN SURGERY      APPENDECTOMY  03/12/2014    ARTHROSCOPY KNEE Left 02/03/2017    Med meniscectomy, DML    BIOPSY  1990    BIOPSY OF SKIN LESION  1990    inner lip cyst    COLONOSCOPY N/A 06/12/2017    Procedure: COMBINED COLONOSCOPY, SINGLE OR MULTIPLE BIOPSY/POLYPECTOMY BY BIOPSY;  COLONOSCOPY with biopsy and polypectomy by snare.;  Surgeon: Venkatesh Billings MD;  Location:  GI    COLONOSCOPY  6/12/2010    GENITOURINARY SURGERY  2014    ORTHOPEDIC SURGERY  2/3/2017    VASECTOMY  2014         Review of Systems  Constitutional, HEENT, cardiovascular, pulmonary, GI, , musculoskeletal, neuro, skin, endocrine and psych systems are negative, except as otherwise noted.     Objective    Exam  /76   Pulse 76   Temp 97.3  F (36.3  C) (Temporal)   Ht 1.79 m (5' 10.47\")   Wt 111.1 kg (245 lb)   BMI 34.68 kg/m     Estimated body mass index is 34.68 kg/m  as calculated from the following:    Height as of this encounter: 1.79 m (5' 10.47\").    Weight as of this encounter: 111.1 kg (245 lb).    Physical Exam  Constitutional:       Appearance: He is obese.   HENT:      Head: Normocephalic and atraumatic.      Right Ear: Tympanic membrane, ear canal and external ear normal. There is no impacted cerumen.      Left Ear: Tympanic membrane, ear canal and external ear normal. There " is no impacted cerumen.      Nose: Nose normal. No congestion.      Mouth/Throat:      Pharynx: Oropharynx is clear. No oropharyngeal exudate or posterior oropharyngeal erythema.   Eyes:      General:         Right eye: No discharge.         Left eye: No discharge.      Extraocular Movements: Extraocular movements intact.      Conjunctiva/sclera: Conjunctivae normal.      Pupils: Pupils are equal, round, and reactive to light.   Cardiovascular:      Rate and Rhythm: Normal rate and regular rhythm.      Heart sounds: Normal heart sounds. No murmur heard.     No friction rub.   Pulmonary:      Effort: Pulmonary effort is normal. No respiratory distress.      Breath sounds: Normal breath sounds. No wheezing or rhonchi.   Abdominal:      General: Abdomen is flat. There is no distension.      Palpations: Abdomen is soft. There is no mass.      Tenderness: There is no abdominal tenderness. There is no guarding.   Musculoskeletal:         General: No swelling.      Cervical back: Normal range of motion and neck supple. No tenderness.      Right lower leg: No edema.      Left lower leg: No edema.   Lymphadenopathy:      Cervical: No cervical adenopathy.   Skin:     General: Skin is warm.      Findings: No rash.   Neurological:      General: No focal deficit present.      Mental Status: He is alert.      Cranial Nerves: No cranial nerve deficit.      Motor: No weakness.      Coordination: Coordination normal.      Gait: Gait normal.   Psychiatric:         Mood and Affect: Mood normal.         Behavior: Behavior normal.         Thought Content: Thought content normal.         Judgment: Judgment normal.           Labs: Pending    Signed Electronically by: Jaquan Morse MD

## 2024-08-22 NOTE — NURSING NOTE
Prior to immunization administration, verified patients identity using patient s name and date of birth. Please see Immunization Activity for additional information.     Screening Questionnaire for Adult Immunization    Are you sick today?   No   Do you have allergies to medications, food, a vaccine component or latex?   No   Have you ever had a serious reaction after receiving a vaccination?   No   Do you have a long-term health problem with heart, lung, kidney, or metabolic disease (e.g., diabetes), asthma, a blood disorder, no spleen, complement component deficiency, a cochlear implant, or a spinal fluid leak?  Are you on long-term aspirin therapy?   No   Do you have cancer, leukemia, HIV/AIDS, or any other immune system problem?   No   Do you have a parent, brother, or sister with an immune system problem?   No   In the past 3 months, have you taken medications that affect  your immune system, such as prednisone, other steroids, or anticancer drugs; drugs for the treatment of rheumatoid arthritis, Crohn s disease, or psoriasis; or have you had radiation treatments?   No   Have you had a seizure, or a brain or other nervous system problem?   No   During the past year, have you received a transfusion of blood or blood    products, or been given immune (gamma) globulin or antiviral drug?   No   For women: Are you pregnant or is there a chance you could become       pregnant during the next month?   No   Have you received any vaccinations in the past 4 weeks?   No     Immunization questionnaire answers were all negative.      Patient instructed to remain in clinic for 15 minutes afterwards, and to report any adverse reactions.     Screening performed by Monika Lau on 8/22/2024 at 8:35 AM.

## 2024-08-22 NOTE — RESULT ENCOUNTER NOTE
Cas Wilson,    If you have not viewed these results on [employer] within 3 days, we will use an alternative method to contact you. We will contact you via the following protocol:    - Via letter if your results are normal.  - Via phone ([phone]) if your results are abnormal.     Here are my comments about your recent results:    CBC Results - Your cell counts were normal.    Please call the clinic ([phone]), or message us on [employer] with any questions you may have.     Have a great day,    Dr. Ware

## 2024-08-22 NOTE — RESULT ENCOUNTER NOTE
"Cas Wilson,    If you have not viewed these results on [facility] within 3 days, we will use an alternative method to contact you. We will contact you via the following protocol:    - Via letter if your results are normal.  - Via phone ([phone]) if your results are abnormal.     Here are my comments about your recent results:    CMP Results - Your blood sugar was mildly elevated, an added on follow-up test is pending. Your kidney function, electrolytes, and liver function were normal.    Magnesium was normal.    TSH - Your thyroid lab results were abnormal. We should recheck this in the 2 months. This can be done with a lab only visit, you do not need to see me during that time. Please call and set up this \"lab only\" visit. If still abnormal we will increase your thyroid medication.      Please call the clinic ([phone]), or message us on [employer] with any questions you may have.     Have a great day,    Dr. Ware"

## 2024-08-22 NOTE — RESULT ENCOUNTER NOTE
Cas Wilson,    If you have not viewed these results on [employer] within 3 days, we will use an alternative method to contact you. We will contact you via the following protocol:    - Via letter if your results are normal.  - Via phone ([phone]) if your results are abnormal.     Here are my comments about your recent results:    A1c - Your 3 month blood sugar average was normal.    Please call the clinic ([phone]), or message us on [employer] with any questions you may have.     Have a great day,    Dr. Ware

## 2024-08-22 NOTE — PATIENT INSTRUCTIONS
Patient Education     Please call [phone] to schedule your imaging study.   Preventive Care Advice   This is general advice given by our system to help you stay healthy. However, your care team may have specific advice just for you. Please talk to your care team about your preventive care needs.  Nutrition  Eat 5 or more servings of fruits and vegetables each day.  Try wheat bread, brown rice and whole grain pasta (instead of white bread, rice, and pasta).  Get enough calcium and vitamin D. Check the label on foods and aim for 100% of the RDA (recommended daily allowance).  Lifestyle  Exercise at least 150 minutes each week  (30 minutes a day, 5 days a week).  Do muscle strengthening activities 2 days a week. These help control your weight and prevent disease.  No smoking.  Wear sunscreen to prevent skin cancer.  Have a dental exam and cleaning every 6 months.  Yearly exams  See your health care team every year to talk about:  Any changes in your health.  Any medicines your care team has prescribed.  Preventive care, family planning, and ways to prevent chronic diseases.  Shots (vaccines)   HPV shots (up to age 26), if you've never had them before.  Hepatitis B shots (up to age 59), if you've never had them before.  COVID-19 shot: Get this shot when it's due.  Flu shot: Get a flu shot every year.  Tetanus shot: Get a tetanus shot every 10 years.  Pneumococcal, hepatitis A, and RSV shots: Ask your care team if you need these based on your risk.  Shingles shot (for age 50 and up)  General health tests  Diabetes screening:  Starting at age 35, Get screened for diabetes at least every 3 years.  If you are younger than age 35, ask your care team if you should be screened for diabetes.  Cholesterol test: At age 39, start having a cholesterol test every 5 years, or more often if advised.  Bone density scan (DEXA): At age 50, ask your care team if you should have this scan for osteoporosis (brittle bones).  Hepatitis  C: Get tested at least once in your life.  STIs (sexually transmitted infections)  Before age 24: Ask your care team if you should be screened for STIs.  After age 24: Get screened for STIs if you're at risk. You are at risk for STIs (including HIV) if:  You are sexually active with more than one person.  You don't use condoms every time.  You or a partner was diagnosed with a sexually transmitted infection.  If you are at risk for HIV, ask about PrEP medicine to prevent HIV.  Get tested for HIV at least once in your life, whether you are at risk for HIV or not.  Cancer screening tests  Cervical cancer screening: If you have a cervix, begin getting regular cervical cancer screening tests starting at age 21.  Breast cancer scan (mammogram): If you've ever had breasts, begin having regular mammograms starting at age 40. This is a scan to check for breast cancer.  Colon cancer screening: It is important to start screening for colon cancer at age 45.  Have a colonoscopy test every 10 years (or more often if you're at risk) Or, ask your provider about stool tests like a FIT test every year or Cologuard test every 3 years.  To learn more about your testing options, visit:   .  For help making a decision, visit:   https://bit.ly/km14955.  Prostate cancer screening test: If you have a prostate, ask your care team if a prostate cancer screening test (PSA) at age 55 is right for you.  Lung cancer screening: If you are a current or former smoker ages 50 to 80, ask your care team if ongoing lung cancer screenings are right for you.  For informational purposes only. Not to replace the advice of your health care provider. Copyright   2023 [employer]. All rights reserved. Clinically reviewed by the [facility]. Mgv 346056 - REV 01/24.

## 2024-08-22 NOTE — RESULT ENCOUNTER NOTE
Cas Wilson,    If you have not viewed these results on [employer] within 3 days, we will use an alternative method to contact you. We will contact you via the following protocol:    - Via letter if your results are normal.  - Via phone ([phone]) if your results are abnormal.     Here are my comments about your recent results:    Your urine test was normal.    Please call the clinic ([phone]), or message us on [employer] with any questions you may have.     Have a great day,    Dr. Ware

## 2024-08-30 ENCOUNTER — ANCILLARY PROCEDURE (OUTPATIENT)
Dept: CT IMAGING | Facility: CLINIC | Age: 44
End: 2024-08-30
Attending: FAMILY MEDICINE
Payer: COMMERCIAL

## 2024-08-30 DIAGNOSIS — R10.32 LLQ ABDOMINAL PAIN: ICD-10-CM

## 2024-08-30 PROCEDURE — 74177 CT ABD & PELVIS W/CONTRAST: CPT | Performed by: RADIOLOGY

## 2024-08-30 RX ORDER — IOPAMIDOL 755 MG/ML
135 INJECTION, SOLUTION INTRAVASCULAR ONCE
Status: COMPLETED | OUTPATIENT
Start: 2024-08-30 | End: 2024-08-30

## 2024-08-30 RX ADMIN — IOPAMIDOL 135 ML: 755 INJECTION, SOLUTION INTRAVASCULAR at 08:25

## 2024-08-30 NOTE — RESULT ENCOUNTER NOTE
Cas Wilson,    If you have not viewed these results on [employer] within 3 days, we will use an alternative method to contact you. We will contact you via the following protocol:    - Via letter if your results are normal.  - Via phone ([phone]) if your results are abnormal.     Here are my comments about your recent results:    CT was normal.     I have placed this referral for you (GI). You should get a call to schedule this within 2 business days, or you can call to schedule at [phone].    Please call the clinic ([phone]), or message us on [employer] with any questions you may have.     Have a great day,    Dr. Ware

## 2024-09-16 ENCOUNTER — VIRTUAL VISIT (OUTPATIENT)
Dept: GASTROENTEROLOGY | Facility: CLINIC | Age: 44
End: 2024-09-16
Attending: FAMILY MEDICINE
Payer: COMMERCIAL

## 2024-09-16 DIAGNOSIS — R14.0 ABDOMINAL BLOATING: Primary | ICD-10-CM

## 2024-09-16 DIAGNOSIS — R19.5 LOOSE STOOLS: ICD-10-CM

## 2024-09-16 DIAGNOSIS — R14.0 ABDOMINAL DISTENTION: ICD-10-CM

## 2024-09-16 PROCEDURE — 99204 OFFICE O/P NEW MOD 45 MIN: CPT | Mod: 95 | Performed by: PHYSICIAN ASSISTANT

## 2024-09-16 NOTE — PROGRESS NOTES
GASTROENTEROLOGY NEW PATIENT VIDEO VISIT    CC/REFERRING MD:    Mini, Jaquan Morse    REASON FOR CONSULTATION:   Jaquan Morse for   Chief Complaint   Patient presents with    Consult       HISTORY OF PRESENT ILLNESS:    Steve Vo is a 43 year old male with a past medical history significant for hypothyroidism who is being evaluated via a billable video visit for evaluation of roughly 10 years of ongoing abdominal bloating and distention.  The seem to start after undergoing appendectomy about 10 years ago.  He typically will wake up without much symptoms but throughout the day, will develop bloating and distention through the entire abdomen.  Previously was described having some pain in the left side as well.  No associated nausea, vomiting, early satiety, or unintentional weight loss.  No clear dietary trigger for this, can basically happen with any food he is eating.  He eats an average diet and does not avoid anything.  He does get stool output typically daily, stools tend to be soft and sometimes unformed, described as small pieces.  Not feeling constipated or passing any hard/dry stool.  No blood or mucus in the stool.  He did see primary care recently, had labs notable for normal CBC, CMP, TSH slightly out of range but T4 normal, normal levels of magnesium, hemoglobin A1c.  He then underwent CT abdomen pelvis without clear abnormalities.  He has not had EGD or colonoscopy before.  No other pertinent surgical history beyond appendectomy.  He does use nicotine vape and drinks alcohol socially, no drug use.      I have reviewed and updated the patient's Past Medical History, Social History, Family History and Medication List.    Exam:    General appearance:  Healthy appearing adult, in no acute distress  Eyes:  Sclera anicteric, Pupils round and reactive to light  Ears, nose, mouth and throat:  No obvious external lesions of ears and nose.  Hearing intact  Neck:   Symmetric, No obvious external lesions  Respiratory:  Normal respiration, no use of accessory muscles   MSK:  No visual upper extremity, neck or facial muscle atrophy  ABD:  No visual abdominal distention, no audible borborygmi  Skin:  No rashes or jaundice   Psychiatric:  Oriented to person, place and time, Appropriate mood and affect.   Neurologic:  Peripheral muscle function and dexterity appear to be intact      PERTINENT STUDIES have been reviewed.    ASSESSMENT/PLAN:    Steve Vo is a 43 year old male who presents for evaluation of longstanding history of bloating and distention in the abdomen, occasionally with some pain/discomfort.  There is also some softer/unformed stool on a typical basis as well.  We reviewed his recent laboratory testing and CT imaging.  His imaging does suggest some stool throughout the colon, which could indicate constipation/slow motility.  We reviewed some other potential etiologies, such as IBD, IBS, malabsorptive process, among others.  Plan for labs as detailed below.  Will also assess motility with upper GI series with small bowel follow-through.  If indicated, we will arrange for EGD and colonoscopy as well.    1. Abdominal distention  - Adult GI  Referral - Consult Only  - Tissue transglutaminase harley IgA and IgG; Future  - IgA; Future  - CRP, inflammation; Future  - Calprotectin Feces; Future  - Elastase Fecal; Future  - XR Upper GI w Small Bowel Follow Through; Standing    2. Abdominal bloating  - Tissue transglutaminase harley IgA and IgG; Future  - IgA; Future  - CRP, inflammation; Future  - Calprotectin Feces; Future  - Elastase Fecal; Future  - XR Upper GI w Small Bowel Follow Through; Standing    3. Loose stools  - Tissue transglutaminase harley IgA and IgG; Future  - IgA; Future  - CRP, inflammation; Future  - Calprotectin Feces; Future  - Elastase Fecal; Future  - XR Upper GI w Small Bowel Follow Through; Standing        Video-Visit Details    Video Visit  Time: 10 minutes    Type of service:  Video Visit    Originating Location (pt. Location): Home    Distant Location (provider location):  Off-site    Platform used for Video Visit: Fabian Arango PA-C    Thank you for this consultation.  It was a pleasure to participate in the care of this patient; please contact us with any further questions.  A total of 25 minutes was spent with reviewing the chart, discussing with the patient, documentation and coordination of care on 9/16/2024.    This note was created with voice recognition software, and while reviewed for accuracy, typos may remain.     Elvin Arango PA-C  Division of Gastroenterology, Hepatology and Nutrition  [facility]  [phone]

## 2024-09-16 NOTE — NURSING NOTE
Current patient location: [id number] [place] [address]    Is the patient currently in the state of [place]? YES    Visit mode:VIDEO    If the visit is dropped, the patient can be reconnected by: VIDEO VISIT: Text to cell phone:   Telephone Information:   Mobile [phone]       Will anyone else be joining the visit? NO  (If patient encounters technical issues they should call [phone]    How would you like to obtain your AVS? MyChart    Are changes needed to the allergy or medication list? No    Are refills needed on medications prescribed by this physician? NO    Rooming Documentation:  Questionnaire(s) completed      Reason for visit: Consult    Juan BUTLER

## 2024-10-07 ENCOUNTER — ANCILLARY ORDERS (OUTPATIENT)
Dept: GASTROENTEROLOGY | Facility: CLINIC | Age: 44
End: 2024-10-07

## 2024-10-07 DIAGNOSIS — R14.0 ABDOMINAL DISTENTION: Primary | ICD-10-CM

## 2024-10-07 DIAGNOSIS — R19.5 LOOSE STOOLS: ICD-10-CM

## 2024-10-07 DIAGNOSIS — R14.0 ABDOMINAL BLOATING: ICD-10-CM

## 2024-10-11 ENCOUNTER — HOSPITAL ENCOUNTER (OUTPATIENT)
Dept: GENERAL RADIOLOGY | Facility: CLINIC | Age: 44
Discharge: HOME | End: 2024-10-11
Attending: PHYSICIAN ASSISTANT | Admitting: PHYSICIAN ASSISTANT
Payer: COMMERCIAL

## 2024-10-11 DIAGNOSIS — R14.0 ABDOMINAL BLOATING: ICD-10-CM

## 2024-10-11 DIAGNOSIS — R19.5 LOOSE STOOLS: ICD-10-CM

## 2024-10-11 DIAGNOSIS — R14.0 ABDOMINAL DISTENTION: ICD-10-CM

## 2024-10-11 PROCEDURE — 74240 X-RAY XM UPR GI TRC 1CNTRST: CPT

## 2024-10-11 PROCEDURE — 250N000013 HC RX MED GY IP 250 OP 250 PS 637: Performed by: RADIOLOGY

## 2024-10-11 RX ADMIN — ANTACID/ANTIFLATULENT 4 G: 380; 550; 10; 10 GRANULE, EFFERVESCENT ORAL at 08:41

## 2024-10-15 NOTE — RESULT ENCOUNTER NOTE
Víctor Wilson,    The report from your recent upper GI x-ray is available for you to review.  Overall, this demonstrated normal-appearing esophagus, stomach, and small bowel, with no evidence of obstruction, abnormal peristalsis, or reflux.  The motility appeared normal as well, with the contrast reaching your right colon within an hour.    So, thus far things are reassuring.  I did place some lab orders for you to complete at your visit.  If you have not already done these, please consider completing these tests.  This will help determine if further testing like upper endoscopy or colonoscopy would be useful.    Please let me know if you have any questions.    Sincerely,  Elvin LIMA [facility] GI, Hepatology, and Nutrition

## 2024-10-17 ENCOUNTER — LAB (OUTPATIENT)
Dept: LAB | Facility: CLINIC | Age: 44
End: 2024-10-17
Payer: COMMERCIAL

## 2024-10-17 DIAGNOSIS — R14.0 ABDOMINAL DISTENTION: ICD-10-CM

## 2024-10-17 DIAGNOSIS — E03.9 HYPOTHYROIDISM, UNSPECIFIED TYPE: ICD-10-CM

## 2024-10-17 DIAGNOSIS — R14.0 ABDOMINAL BLOATING: ICD-10-CM

## 2024-10-17 DIAGNOSIS — R19.5 LOOSE STOOLS: ICD-10-CM

## 2024-10-17 LAB
CRP SERPL-MCNC: 3.63 MG/L
TSH SERPL DL<=0.005 MIU/L-ACNC: 3.95 UIU/ML (ref 0.3–4.2)

## 2024-10-17 PROCEDURE — 84443 ASSAY THYROID STIM HORMONE: CPT

## 2024-10-17 PROCEDURE — 86364 TISS TRNSGLTMNASE EA IG CLAS: CPT

## 2024-10-17 PROCEDURE — 86140 C-REACTIVE PROTEIN: CPT

## 2024-10-17 PROCEDURE — 82784 ASSAY IGA/IGD/IGG/IGM EACH: CPT

## 2024-10-17 PROCEDURE — 36415 COLL VENOUS BLD VENIPUNCTURE: CPT

## 2024-10-18 LAB — IGA SERPL-MCNC: 246 MG/DL (ref 84–499)

## 2024-10-21 LAB
TTG IGA SER-ACNC: 0.4 U/ML
TTG IGG SER-ACNC: <0.6 U/ML

## 2024-10-21 NOTE — RESULT ENCOUNTER NOTE
Cas Wilson,    If you have not viewed these results on [employer] within 3 days, we will use an alternative method to contact you. We will contact you via the following protocol:    - Via letter if your results are normal.  - Via phone ([phone]) if your results are abnormal.     Here are my comments about your recent results:    TSH - Your thyroid lab results were normal.    Please call the clinic ([phone]), or message us on [employer] with any questions you may have.     Have a great day,    Dr. Ware

## 2024-10-22 NOTE — RESULT ENCOUNTER NOTE
Víctor Wilson,    Your recent set of blood tests have come back normal.  I will be in touch when I see the results of the stool tests.    Please let me know if you have any questions.    Sincerely,  Elvin LIMA [facility] GI, Hepatology, and Nutrition

## 2024-11-06 PROCEDURE — 82653 EL-1 FECAL QUANTITATIVE: CPT

## 2024-11-06 PROCEDURE — 83993 ASSAY FOR CALPROTECTIN FECAL: CPT

## 2024-11-08 LAB
CALPROTECTIN STL-MCNT: 8.3 MG/KG (ref 0–49.9)
ELASTASE PANC STL-MCNT: >800 UG/G

## 2024-11-11 NOTE — RESULT ENCOUNTER NOTE
Víctor Wilson,    Your recent laboratory testing is now completed, stool test screening for inflammation is normal and the stool test screening for malabsorption is normal as well.  These are reassuring findings and suggest that your symptoms are more likely to be related to irritable bowel syndrome, or IBS.    There are a few ways to go about trying to treat this.  If you are open to trying some dietary modification, a trial of the low FODMAP diet can be useful to identify foods that may be triggers for your symptoms.  There is more information on low FODMAP diet trial here - ibsfree.net.    Alternatively, you may benefit from some medication therapies.  A good place to start would be a supplemental fiber, such as Citrucel or some fiber, starting with 1 teaspoon daily and titrating upwards as tolerated.  This can produce more predictable stool output and can treat the global symptoms of IBS as well.    There are some prescription options for treating IBS symptoms, but these are typically considered second line.  If you try 1 or both of the above treatment modalities and they do not work, lets plan for a follow-up in a few months to review some alternative options.    Please let me know if you have any questions.    Sincerely,  Elvin LIMA [facility] GI, Hepatology, and Nutrition

## 2025-01-24 NOTE — DISCHARGE INSTRUCTIONS
[facility]  Same-Day Surgery   Adult Discharge Orders & Instructions   For 24 hours after surgery  1. Get plenty of rest.  A responsible adult must stay with you for at least 24 hours after you leave the hospital.   2. Do not drive or use heavy equipment.  If you have weakness or tingling, don't drive or use heavy equipment until this feeling goes away.  3. Do not drink alcohol.  4. Avoid strenuous or risky activities.  Ask for help when climbing stairs.   5. You may feel lightheaded.  IF so, sit for a few minutes before standing.  Have someone help you get up.   6. If you have nausea (feel sick to your stomach): Drink only clear liquids such as apple juice, ginger ale, broth or 7-Up.  Rest may also help.  Be sure to drink enough fluids.  Move to a regular diet as you feel able.  7. You may have a slight fever. Call the doctor if your fever is over 100 F (37.7 C) (taken under the tongue) or lasts longer than 24 hours.  8. You may have a dry mouth, a sore throat, muscle aches or trouble sleeping.  These should go away after 24 hours.  9. Do not make important or legal decisions.   Call your doctor for any of the followin.  Signs of infection (fever, growing tenderness at the surgery site, a large amount of drainage or bleeding, severe pain, foul-smelling drainage, redness, swelling).    2. It has been over 8 to 10 hours since surgery and you are still not able to urinate (pass water).    3.  Headache for over 24 hours.      To contact a doctor, call [phone]___________________________       Tylenol was given at 10:20 am.    No Ibuprofen before 5:15 pm.     Inpatient Records

## 2025-02-11 PROBLEM — D12.6 ADENOMATOUS COLON POLYP: Status: ACTIVE | Noted: 2025-02-11

## 2025-03-03 ENCOUNTER — ANCILLARY PROCEDURE (OUTPATIENT)
Dept: GENERAL RADIOLOGY | Facility: CLINIC | Age: 45
End: 2025-03-03
Payer: COMMERCIAL

## 2025-03-03 ENCOUNTER — OFFICE VISIT (OUTPATIENT)
Dept: FAMILY MEDICINE | Facility: CLINIC | Age: 45
End: 2025-03-03
Payer: COMMERCIAL

## 2025-03-03 VITALS
OXYGEN SATURATION: 100 % | BODY MASS INDEX: 35.78 KG/M2 | RESPIRATION RATE: 16 BRPM | DIASTOLIC BLOOD PRESSURE: 78 MMHG | WEIGHT: 249.9 LBS | TEMPERATURE: 97.9 F | HEIGHT: 70 IN | SYSTOLIC BLOOD PRESSURE: 125 MMHG | HEART RATE: 84 BPM

## 2025-03-03 DIAGNOSIS — M54.16 LUMBAR RADICULOPATHY: ICD-10-CM

## 2025-03-03 DIAGNOSIS — G56.01 CARPAL TUNNEL SYNDROME OF RIGHT WRIST: ICD-10-CM

## 2025-03-03 DIAGNOSIS — M54.12 CERVICAL RADICULOPATHY: Primary | ICD-10-CM

## 2025-03-03 DIAGNOSIS — M54.12 CERVICAL RADICULOPATHY: ICD-10-CM

## 2025-03-03 PROCEDURE — 72040 X-RAY EXAM NECK SPINE 2-3 VW: CPT | Mod: TC | Performed by: RADIOLOGY

## 2025-03-03 PROCEDURE — 3078F DIAST BP <80 MM HG: CPT

## 2025-03-03 PROCEDURE — 1126F AMNT PAIN NOTED NONE PRSNT: CPT

## 2025-03-03 PROCEDURE — 3074F SYST BP LT 130 MM HG: CPT

## 2025-03-03 PROCEDURE — 72100 X-RAY EXAM L-S SPINE 2/3 VWS: CPT | Mod: TC | Performed by: RADIOLOGY

## 2025-03-03 PROCEDURE — 99213 OFFICE O/P EST LOW 20 MIN: CPT

## 2025-03-03 RX ORDER — MELOXICAM 15 MG/1
15 TABLET ORAL DAILY PRN
Qty: 60 TABLET | Refills: 0 | Status: SHIPPED | OUTPATIENT
Start: 2025-03-03

## 2025-03-03 ASSESSMENT — PAIN SCALES - GENERAL: PAINLEVEL_OUTOF10: NO PAIN (0)

## 2025-03-03 NOTE — PATIENT INSTRUCTIONS
For further evaluation I have ordered x-ray imaging of your neck and low back. I will reach out via [employer] with results once images have been read by the radiologist. Plan to start with a trial of as needed daily anti-inflammatory called Meloxicam. Please take with food to prevent upset stomach. If you are taking daily, I would recommend adding on daily over the counter omeprazole to protect stomach. Do not take additional NSAIDs (ibuprofen/Advil or naproxen/Aleve) while taking this medication. OK to take acetaminophen (Tylenol).     I have placed a referral to physical therapy for further evaluation. This along with the x-ray and Meloxicam will be first step prior to obtaining further imaging of your spine with an MRI. If no improvement despite above interventions after 6 weeks would consider MRI and referral to neurosurgery at that time.     The new numbness/tingling in the right index and middle finger very well could be carpal tunnel. I would like you to wear the wrist splint at bedtime to prevent movement and curling of the wrist. I have placed a referral to orthopedics for further evaluation as they are the ones that complete carpal tunnel release.

## 2025-03-03 NOTE — PROGRESS NOTES
Assessment & Plan     Cervical radiculopathy  Lumbar radiculopathy  Patient is a 44-year-old male with hypothyroidism presenting with concerns of ongoing right upper extremity and lower extremity numbness and tingling for the past 2 years.  Has had comprehensive workup for serologic/metabolic concerns in the past with negative findings along with an EMG.  Suspected degree of cervical and lumbar radiculopathy given duration of symptoms and negative workup thus far.  Obtained XR imaging of lumbar and cervical spine during encounter today to evaluate for degenerative changes in spine.  Discussed trial of conservative management prior to obtaining further imaging.  XR pending formal read by radiologist.  Prescribed as needed daily meloxicam to be used as needed for moderate pain in hopes of reducing inflammation contributing to neuropathic symptoms.  Continue with conservative management including as needed acetaminophen, ice/heat, and topical analgesics.  Referral to physical therapy placed for further evaluation.  If no improvement despite above conservative management after 6 weeks time consider MRI imaging and referral to neurosurgery. Discussed proper use of medication(s) and potential side effects. Follow-up if symptoms fail to improve despite above interventions. Patient understands and is agreeable to plan as discussed in clinic.  - XR Cervical Spine 2/3 Views; Future  - XR Lumbar Spine 2/3 Views; Future  - Physical Therapy  Referral; Future  - meloxicam (MOBIC) 15 MG tablet; Take 1 tablet (15 mg) by mouth daily as needed for moderate pain.    Carpal tunnel syndrome of right wrist  EMG completed in October 2023 that could not definitively diagnose carpal tunnel. Increase in numbness/tingling in radial nerve distribution for the past few weeks. Placed in cock-up wrist splint and advised to wear with repetitive motions and while sleeping. Referral to orthopedics placed for further evaluation.   -  Orthopedic  Referral; Future  - Wrist/Arm/Hand Bracing Supplies Order Wrist Brace; Right; with thumb barry Wilson is a 44 year old, presenting for the following health issues:    - did neuropathy testing ~6mo ago  - has talked to DF about back pain  - chiropractor said he has lump in cervical spine, possible disc bulge, pushing on bulge can feel it shoot down into R arm/fingers  - now experiencing old hands and feet, has increased to forearms and calves on both sides  - also numbness in first 3 digits on R hand, very bothersome  - chiro has been helpful for taming tingling in lower legs    Musculoskeletal Problem      3/3/2025     2:45 PM   Additional Questions   Roomed by AG   Accompanied by self     History of Present Illness       Back Pain:  He presents for follow up of back pain. Patient's back pain is a chronic problem.  Location of back pain:  Right upper back and right side of waist  Description of back pain: burning and dull ache  Back pain spreads: right shoulder    Since patient first noticed back pain, pain is: always present, but gets better and worse  Does back pain interfere with his job:  No       Reason for visit:  Neck and back pain numb fingers on right hand cold feeling in feet and hands  Symptom onset:  More than a month  Symptoms include:  Cold feet and hands pain in back right shoulder and neck stiffness  Symptom intensity:  Moderate  Symptom progression:  Worsening  Had these symptoms before:  Yes  Has tried/received treatment for these symptoms:  No  What makes it worse:  No  What makes it better:  No He is missing 1 dose(s) of medications per week.  He is not taking prescribed medications regularly due to remembering to take.        Presents with concerns of chronic right upper extremity numbness/tingling  and cold sensation that has been ongoing for the past 2 years.Has had sufficient workup with serologic testing and EMG without conclusion of reason for symptoms.  " Does see chiropractor regularly who is concerned that there is a degree of bulging/herniated disks. No formal imaging of neck/low back or evaluation by physical therapy.         Objective    /78 (BP Location: Left arm, Patient Position: Chair, Cuff Size: Adult Regular)   Pulse 84   Temp 97.9  F (36.6  C) (Temporal)   Resp 16   Ht 1.78 m (5' 10.08\")   Wt 113.4 kg (249 lb 14.4 oz)   SpO2 100%   BMI 35.78 kg/m    Body mass index is 35.78 kg/m .  Physical Exam  Vitals reviewed.   Constitutional:       General: He is not in acute distress.     Appearance: Normal appearance. He is not ill-appearing.   Musculoskeletal:      Right hand: Decreased sensation of the radial distribution.      Cervical back: Normal, full passive range of motion without pain, normal range of motion and neck supple. No pain with movement, spinous process tenderness or muscular tenderness. Normal range of motion.      Thoracic back: Normal.      Lumbar back: Normal. No tenderness or bony tenderness. Normal range of motion.      Comments: Right hand-negative Phalen's and Tinel's   Neurological:      Mental Status: He is alert.      Sensory: Sensory deficit present.      Motor: Motor function is intact. No weakness.      Comments: Numbness/tingling in right upper and bilateral lower extremities.         XR pending formal read by radiologist.         Signed Electronically by: QUIN Carr CNP    "

## 2025-03-18 ENCOUNTER — THERAPY VISIT (OUTPATIENT)
Dept: PHYSICAL THERAPY | Facility: CLINIC | Age: 45
End: 2025-03-18
Payer: COMMERCIAL

## 2025-03-18 DIAGNOSIS — M54.2 CHRONIC NECK PAIN: Primary | ICD-10-CM

## 2025-03-18 DIAGNOSIS — G89.29 CHRONIC NECK PAIN: Primary | ICD-10-CM

## 2025-03-18 DIAGNOSIS — M54.16 LUMBAR RADICULOPATHY: ICD-10-CM

## 2025-03-18 DIAGNOSIS — M54.12 CERVICAL RADICULOPATHY: ICD-10-CM

## 2025-03-18 PROCEDURE — 97110 THERAPEUTIC EXERCISES: CPT | Mod: GP

## 2025-03-18 PROCEDURE — 97161 PT EVAL LOW COMPLEX 20 MIN: CPT | Mod: GP

## 2025-03-18 NOTE — PROGRESS NOTES
PHYSICAL THERAPY EVALUATION  Type of Visit: Evaluation       Fall Risk Screen:  Fall screen completed by: PT  Have you fallen 2 or more times in the past year?: No  Have you fallen and had an injury in the past year?: No  Is patient a fall risk?: No    Subjective         Presenting condition or subjective complaint: Neck into upper right back and down to fingers  Date of onset: 01/01/25    Relevant medical history: Arthritis; Cold or hot arm or leg; Overweight; Significant weakness; Smoking; Thyroid problems   Dates & types of surgery: Apendix removed and knee cleaned out    Prior diagnostic imaging/testing results: X-ray     Prior therapy history for the same diagnosis, illness or injury: No        Living Environment  Social support: With family members   Type of home: House; 2-story   Stairs to enter the home: No       Ramp: No   Stairs inside the home: Yes 20 Is there a railing: Yes     Help at home: None  Equipment owned: Crutches     Employment: Yes [employer]  Hobbies/Interests: Golf, fishing, hunting, and family    Patient goals for therapy: Feel my fingers      PHYSICAL THERAPY CERVICAL EVALUATION    SUBJECTIVE:  Steve is a 44 year old male who reports that right now his 2nd/3rd digits on his right hand ar numb.  He also reports cold limbs in his legs.  It initially started with his right hand and it's progressively increased up his arm.  He went to the chiropractor who suspected a bulge.  He said he push on the lower right side of the C-spine (approximately C7) and reproduce the push down his neck.    Patient reports symptoms of:  Pain, Stiffness / Tightness, Numbness, Tingliness, and Weakness    Patient report of Pain:  Pain Rating Now: 3/10  Pain Rating at Best: 0/10  Pain Rating at Worst: 5/10  Pain Location: Right 2/3 index and down right arm  Pain Quality/Description: Numbness/Tingliness some stiffness  Pain Better with: Chiropractor, TENS, Really pushing on Neck. Head flexion & left side bend makes it  go away.  Pressure on right C7 area  Pain Worse with: Random and unsure  Progression of Symptoms: Unchanged    Patient reports Red Flags symptoms of:  None    OBJECTIVE:  Cervical AROM  Cervical Flexion: No Restriction and WNL  Cervical Extension: No Restriction and WNL, but tight  Cervical Right Side Bend: No Restriction and WNL  Cervical Left Side Bend: No Restriction and WNL, but tight  Cervical Right Rotation: No Restriction and WNL  Cervical Left Rotation: No Restriction and WNL    Upper Extremity Manual Muscle Test / Myotome Assessment:  Shoulder Flexion (C5): Right Shoulder 5/5, Left Shoulder 5/5  Shoulder Abduction (C5): Right Shoulder 5/5, Left Shoulder 5/5  Shoulder Abduction (C5): Right Shoulder 5/5, Left Shoulder 5/5  Elbow Flexion (C6): Right Elbow 5/5, Left Elbow 5/5  Elbow Extension (C7): Right Elbow 5/5, Left Elbow 5/5  Wrist Extension (C6): Right Wrist 5/5, Left Wrist 5/5  Thumb Extension (C8): Right Thumb 5/5, Left Thumb 5/5  Finger Abduction / Adduction (T1): Right Hand 5/5, Left Hand 5/5   Strength: Right Hand: 95. Left Hand 105 (Right hand dominant)    Upper Extremity Neural Tension Assessment  Right Median Nerve: Negative Median Nerve Tension   Left Median Nerve: Negative Median Nerve Tension    Right Radial Nerve: Negative for Radial Nerve Tension  Left Radial Nerve: Negative for Radial Nerve Tension    Right Ulnar Nerve: Negative Ulnar Nerve Tension  Left Ulnar Nerve: Negative Ulnar Nerve Tension     Cervical Special Tests  Right Side Spurlings Test: Negative  Left Side Spurlings Test: Negative    Siva BOSET Repeated Movements Cervical Assessment:  Repeated Cervical Flexion: Better initially and no better afterwards  Repeated Cervical Extension: No Effect  Repeated Cervical Protraction: Better initially and no better afterwards  Repeated Cervical Retraction: Better and Centralized shoulder blade with no effect on the right hand  Repeated Cervical Retraction with Overpressure: Better,  Centralized, and slightly worse afterwards      Siva MDT Classification: Derangement Retraction/Extension Responder       ASSESSMENT:  Steve is a 44 year old male referred to Physical Therapy for Cervical Radiculopathy and Lumbar Radiculopathy   from [facility].  Steve demonstrates findings of Pain, Weakness, Radicular Symptoms, and Neurological Symptoms that justify a need for formal Physical Therapy. These impairments interfere with their ability to perform self care tasks, work tasks, recreational activities, household chores, driving , household mobility, and community mobility as compared to their previous level of function.    Medical Diagnosis: Cervical Radiculopathy and Lumbar Radiculopathy    Treatment Diagnosis: Cervical Radiculopathy and Neck Pain     Clinical Decision Making (Complexity):  Clinical Presentation: Stable/Uncomplicated  Clinical Presentation Rationale: based on medical and personal factors listed in PT evaluation  Clinical Decision Making (Complexity): Low complexity      PHYSICAL THERAPY PLAN OF CARE:  Treatment Interventions:  Modalities: Cryotherapy, E-stim, Hot Pack, Mechanical Traction, Ultrasound  Interventions: Gait Training, Manual Therapy, Neuromuscular Re-education, Therapeutic Activity, Therapeutic Exercise    Long Term Goals     PT Goal 1  Goal Identifier: Hand Numbness /  Strength  Goal Description: Steve will be able to  all objects and use fine motor skills without hand numbness worse than 1/10 and with  strength equal or greater to his left non-dominant hand  Rationale: to maximize safety and independence with performance of ADLs and functional tasks;to maximize safety and independence within the home;to maximize safety and independence with self cares;to maximize safety and independence with transportation;to maximize safety and independence within the community  Goal Progress:  Strength 95 lbs right hand and and 105 lbs left hand  Target Date:  06/10/25    Frequency of Treatment: 1x a week  Duration of Treatment: 12 weeks         Risks and benefits of evaluation/treatment have been explained.   Patient/Family/caregiver agrees with Plan of Care.      Evaluation Time:     PT Eval, Low Complexity Minutes (29437): 30         Signing Clinician: David Celeste PT        SUMMARY OF PLAN OF CARE:  Steve presents with scapular pain and tightness in her right periscapular region as well as right 2nd and 3rd digit numbness of over 2 years. He gets a clear radicular presentation with self-pressure applied to the right lateral aspect of his C6-C7-T1 vertebra that radiates down his right arm and two his right scapula. His presentation is consistent with cervical radiculopathy and is classified as an MDT retraction/extension responder.  We will utilize a combination of MDT based principles as well as periscapular strengthening, manual therapy, and cervical strengthening/mobility based on his response.

## 2025-03-26 ENCOUNTER — THERAPY VISIT (OUTPATIENT)
Dept: PHYSICAL THERAPY | Facility: CLINIC | Age: 45
End: 2025-03-26
Payer: COMMERCIAL

## 2025-03-26 DIAGNOSIS — G89.29 CHRONIC NECK PAIN: Primary | ICD-10-CM

## 2025-03-26 DIAGNOSIS — M54.12 CERVICAL RADICULOPATHY: ICD-10-CM

## 2025-03-26 DIAGNOSIS — M54.16 LUMBAR RADICULOPATHY: ICD-10-CM

## 2025-03-26 DIAGNOSIS — M54.2 CHRONIC NECK PAIN: Primary | ICD-10-CM

## 2025-03-26 PROCEDURE — 97110 THERAPEUTIC EXERCISES: CPT | Mod: GP

## 2025-04-09 ENCOUNTER — THERAPY VISIT (OUTPATIENT)
Dept: PHYSICAL THERAPY | Facility: CLINIC | Age: 45
End: 2025-04-09
Payer: COMMERCIAL

## 2025-04-09 DIAGNOSIS — M54.16 LUMBAR RADICULOPATHY: ICD-10-CM

## 2025-04-09 DIAGNOSIS — G89.29 CHRONIC NECK PAIN: Primary | ICD-10-CM

## 2025-04-09 DIAGNOSIS — M54.12 CERVICAL RADICULOPATHY: ICD-10-CM

## 2025-04-09 DIAGNOSIS — M54.2 CHRONIC NECK PAIN: Primary | ICD-10-CM

## 2025-04-09 PROCEDURE — 97140 MANUAL THERAPY 1/> REGIONS: CPT | Mod: GP

## 2025-04-09 PROCEDURE — 97112 NEUROMUSCULAR REEDUCATION: CPT | Mod: GP

## 2025-04-09 PROCEDURE — 97110 THERAPEUTIC EXERCISES: CPT | Mod: GP

## 2025-04-15 NOTE — NURSING NOTE
"Chief Complaint   Patient presents with     Rectal Problem     Panel Management     Tobacco Use, UA       Initial /90  Pulse 80  Temp 97.9  F (36.6  C) (Oral)  Resp 20  Ht 5' 10.67\" (1.795 m)  Wt 235 lb 8 oz (106.8 kg)  BMI 33.15 kg/m2 Estimated body mass index is 33.15 kg/(m^2) as calculated from the following:    Height as of this encounter: 5' 10.67\" (1.795 m).    Weight as of this encounter: 235 lb 8 oz (106.8 kg).  Medication Reconciliation: complete     Deysi Escobedo CMA    "
MEILSSA Mata - CNP on 4/15/2025 at 8:22 AM

## 2025-04-16 ENCOUNTER — THERAPY VISIT (OUTPATIENT)
Dept: PHYSICAL THERAPY | Facility: CLINIC | Age: 45
End: 2025-04-16
Payer: COMMERCIAL

## 2025-04-16 DIAGNOSIS — M54.2 CHRONIC NECK PAIN: Primary | ICD-10-CM

## 2025-04-16 DIAGNOSIS — G89.29 CHRONIC NECK PAIN: Primary | ICD-10-CM

## 2025-04-16 DIAGNOSIS — M54.12 CERVICAL RADICULOPATHY: ICD-10-CM

## 2025-04-16 DIAGNOSIS — M54.16 LUMBAR RADICULOPATHY: ICD-10-CM

## 2025-04-16 PROCEDURE — 97110 THERAPEUTIC EXERCISES: CPT | Mod: GP

## 2025-04-16 NOTE — PROGRESS NOTES
04/16/25 0500   Appointment Info   Signing clinician's name / credentials David Celeste, PT, DPT, CSCS, CLT and Marilynn Nicholas, SPT   Total/Authorized Visits E&T   Visits Used 4   Medical Diagnosis Cervical Radiculopathy and Lumbar Radiculopathy   PT Tx Diagnosis Cervical Radiculopathy and Neck Pain   Progress Note/Certification   Onset of illness/injury or Date of Surgery 01/01/25   Therapy Frequency 1x a week   Predicted Duration 12 weeks   Progress Note Due Date 06/10/25   Progress Note Completed Date 03/18/25   PT Goal 1   Goal Identifier Hand Numbness /  Strength   Goal Description Steve will be able to  all objects and use fine motor skills without hand numbness worse than 1/10 and with  strength equal or greater to his left non-dominant hand   Rationale to maximize safety and independence with performance of ADLs and functional tasks;to maximize safety and independence within the home;to maximize safety and independence with self cares;to maximize safety and independence with transportation;to maximize safety and independence within the community   Goal Progress Still no change since starting therapy.  Pain remains in medial to the scapula and down to the right hand with similar intensity and similar reduced  strength   Target Date 06/10/25   Subjective Report   Subjective Report He reports he has not had any change in symptoms despite trialling all types of therapy given last session. Wasnt the most consistent but does not feel like anyhting is helping. He did try bowling and it went okay, has just tried to start doing more activity despite the pain.   Objective Measures   Objective Measures Objective Measure 1;Objective Measure 2;Objective Measure 3;Objective Measure 4;Objective Measure 5;Objective Measure 6   Objective Measure 1   Objective Measure Symptoms on Arrival   Details 4/10 in both shoulder and hand, did take medication this morning for pain   Objective Measure 2   Objective  Measure Median N Tension   Details Negative, mild tightness in forearm with head tilt bilateral   Objective Measure 3   Objective Measure Spurlings   Details Negative Left, pain in right shoulder blade with testing to Right   Objective Measure 4   Objective Measure Cervical ROM   Details all within WNL, fell tightness during extension and right rotation   Objective Measure 5   Objective Measure Hand dynamometry/  strength / UE strength   Details Right Hand: 90, Left Hand: 115 (Small, but no clinically significant change since therapy).  UE MMT: 5/5 in all UE Muscle Groups   Objective Measure 6   Objective Measure Neck Disability Index   Details 9 (18%)   Treatment Interventions (PT)   Interventions Therapeutic Procedure/Exercise;Neuromuscular Re-education;Manual Therapy   Therapeutic Procedure/Exercise   Therapeutic Procedures: strength, endurance, ROM, flexibility minutes (12475) 38   PTRx Ther Proc 1 Spinal extensions   PTRx Ther Proc 1 - Details 1x10 in the neck and mid back   PTRx Ther Proc 2 Supine Retraction/Extension With Overpressure   PTRx Ther Proc 2 - Details 1x10    PTRx Ther Proc 3 Upper Trapezius Stretch   PTRx Ther Proc 3 - Details 2x20 second holds each side   PTRx Ther Proc 4 Scalene Stretch   PTRx Ther Proc 4 - Details 1x20 seconds each side   PTRx Ther Proc 5 First Rib Self Mobilization   PTRx Ther Proc 5 - Details Reviewed   PTRx Ther Proc 6 Pec Stretch Doorway   PTRx Ther Proc 6 - Details Reviewed   Skilled Intervention Thoracic Outlet / Scapular Mobility and Cerivcal/Thoracic Extension with MDT principles   Patient Response/Progress Still no signficant change in symptoms, reviewed exercises to continue at home until able to get visit with doctor for possible imaging   Neuromuscular Re-education   Neuromuscular re-ed of mvmt, balance, coord, kinesthetic sense, posture, proprioception minutes (34939) 2   PTRx Neuro Re-ed 1 Nerve Gliding Proximal Median   PTRx Neuro Re-ed 1 - Details 1x15 each  side   Skilled Intervention Nerve mobility   Patient Response/Progress No significant impact, but spent a resonable amount of time educating. Given his slight median nerve tension and radiculopathy-like symptoms, recommended to continue at home   Plan   Home program See PTRx   Updates to plan of care Continue per POC with focus on neck   Plan for next session discharge   Total Session Time   Timed Code Treatment Minutes 40   Total Treatment Time (sum of timed and untimed services) 40           DISCHARGE  Reason for Discharge: Steve has undergone 4 physical therapy visits.  Since starting therapy we've tried a wide range of interventions including MDT retraction / extension principles, neck and scapula strengthening, neck and upper thoracic mobility, manual therapy techniques and nerve flossing. With all interventions attempted, Steve has had no response with no change in symptoms. His presentation is similar to lower cervical radiculopathy with palpation of the the right articular pillar of C7-T1 consistently reproduce and worsen symptoms that travel down his right arm to his right hand as well as his right scapula, but despite this no intervention we tried offered any relief or change to his symptoms.  Therefor, we recommend he return to his referring provider for follow-up and further assessment and we recommend temporarily discharging from Physical Therapy given he has had no response thusfar. We welcome Steve to return to therapy after further testing to get a better understanding of the origins of his symptoms.    Equipment Issued: None    Discharge Plan: Patient to continue home program.    Referring Provider:  QUIN Carr CNP

## 2025-05-06 DIAGNOSIS — M54.16 LUMBAR RADICULOPATHY: ICD-10-CM

## 2025-05-06 DIAGNOSIS — M54.12 CERVICAL RADICULOPATHY: ICD-10-CM

## 2025-05-07 RX ORDER — MELOXICAM 15 MG/1
15 TABLET ORAL DAILY PRN
Qty: 30 TABLET | Refills: 1 | Status: SHIPPED | OUTPATIENT
Start: 2025-05-07

## 2025-06-24 ENCOUNTER — PATIENT OUTREACH (OUTPATIENT)
Dept: GASTROENTEROLOGY | Facility: CLINIC | Age: 45
End: 2025-06-24
Payer: COMMERCIAL

## 2025-06-24 DIAGNOSIS — Z12.11 SPECIAL SCREENING FOR MALIGNANT NEOPLASMS, COLON: Primary | ICD-10-CM

## 2025-06-24 NOTE — PROGRESS NOTES
"CRC Screening Colonoscopy Referral Review    Patient meets the inclusion criteria for screening colonoscopy standing order.    Ordering/Referring Provider:  Jaquan Morse      BMI: Estimated body mass index is 35.78 kg/m  as calculated from the following:    Height as of 3/3/25: 1.78 m (5' 10.08\").    Weight as of 3/3/25: 113.4 kg (249 lb 14.4 oz).     Sedation:  Does patient have any of the following conditions affecting sedation?  No medical conditions affecting sedation.    Previous Scopes:  Any previous recommendations or follow up needs based on previous scope?  na / No recommendations.    Medical Concerns to Postpone Order:  Does patient have any of the following medical concerns that should postpone/delay colonoscopy referral?  No medical conditions affecting colonoscopy referral.    Final Referral Details:  Based on patient's medical history patient is appropriate for referral order with moderate sedation. If patient's BMI > 50 do not schedule in [facility].  "

## 2025-06-25 ENCOUNTER — TELEPHONE (OUTPATIENT)
Dept: GASTROENTEROLOGY | Facility: CLINIC | Age: 45
End: 2025-06-25
Payer: COMMERCIAL

## 2025-06-25 NOTE — TELEPHONE ENCOUNTER
"Endoscopy Scheduling Screen    Caller: patient    Have you had any respiratory illness or flu-like symptoms in the last 10 days?  No    Patient is ACTIVE on [employer].  Inform patient that all appointment instructions will be sent via [employer].    Review patient's insurance for any non participating payor.    Ordering/Referring Provider:   RELL CHRISTINE        (If ordering provider performs procedure, schedule with ordering provider unless otherwise instructed. )    BMI: Estimated body mass index is 35.78 kg/m  as calculated from the following:    Height as of 3/3/25: 1.78 m (5' 10.08\").    Weight as of 3/3/25: 113.4 kg (249 lb 14.4 oz).     Sedation Ordered  moderate sedation.   If patient BMI > 50 do not schedule in [facility].    If patient BMI > 45 [facility]    Are you taking methadone or Suboxone?  NO, No RN review required.    Have you been diagnosed and are being treated for severe PTSD or severe anxiety?  NO, No RN review required.    Are you taking any prescription medications for pain 3 or more times per week?   NO, No RN review required. PT SAID HE WAS GIVEN TRAMADOL BUT    Do you have a history of malignant hyperthermia?  No    (Females) Are you currently pregnant?   No     Have you been diagnosed or told you have pulmonary hypertension?   No    Do you have an LVAD?  No    Have you been told you have moderate to severe sleep apnea?  No.    Have you been told you have COPD, asthma, or any other lung disease?  No    Has your doctor ordered any cardiac tests like echo, angiogram, stress test, ablation, or EKG, that you have not completed yet?  No    Do you  have a history of any heart conditions?  No     Have you ever had or are you waiting for an organ transplant?  No. Continue scheduling, no site restrictions.    Have you had a stroke or transient ischemic attack (TIA aka \"mini stroke\") in the last 2 years?   No.    Have you been diagnosed with or been told you have cirrhosis of the " "liver?   No.    Are you currently on dialysis?   No    Do you need assistance transferring?   No    BMI: Estimated body mass index is 35.78 kg/m  as calculated from the following:    Height as of 3/3/25: 1.78 m (5' 10.08\").    Weight as of 3/3/25: 113.4 kg (249 lb 14.4 oz).     Is patients BMI > 40 and scheduling location UP?  No    Do you take an injectable or oral medication for weight loss or diabetes (excluding insulin)?  No    Do you take the medication Naltrexone?  No    Do you take blood thinners?  No       Prep   Are you currently on dialysis or do you have chronic kidney disease?  No    Do you have a diagnosis of diabetes?  No    Do you have a diagnosis of cystic fibrosis (CF)?  No    On a regular basis do you go 3 -5 days between bowel movements?  No    BMI > 40?  No    Preferred Pharmacy:    [facility] [id number] [place] [facility], [place] - [address] [address]  [address] [address] [facility] [address]  Phone: [phone] Fax: [phone]    Final Scheduling Details     Procedure scheduled  Colonoscopy    Surgeon:  RENAN     Date of procedure:  07/28/2025     Pre-OP / PAC:   No - Not required for this site.    Location  [facility] - [facility] - Patient preference.    Sedation   Moderate Sedation - Per order.      Patient Reminders:   You will receive a call from a Nurse to review instructions and health history.  This assessment must be completed prior to your procedure.  Failure to complete the Nurse assessment may result in the procedure being cancelled.      On the day of your procedure, please designate an adult(s) who can drive you home stay with you for the next 24 hours. The medicines used in the exam will make you sleepy. You will not be able to drive.      You cannot take public transportation, ride share services, or non-medical taxi service without a responsible caregiver.  Medical transport services are allowed with the requirement that a responsible caregiver will receive you at your destination.  We require that drivers and " caregivers are confirmed prior to your procedure.

## 2025-07-07 ENCOUNTER — TELEPHONE (OUTPATIENT)
Dept: GASTROENTEROLOGY | Facility: CLINIC | Age: 45
End: 2025-07-07
Payer: COMMERCIAL

## 2025-07-07 NOTE — TELEPHONE ENCOUNTER
Bowel Prep Review:  Disclaimer: No call was made to the patient.     Standard Miralax bowel prep.  Discuss if patient is taking Tramadol  Recommended due to standard bowel prep.   Instructions were sent via ki worksusana Chen LPN  Endoscopy Procedure Pre Assessment

## 2025-07-09 NOTE — TELEPHONE ENCOUNTER
Pre visit planning completed.    Procedure details:    Patient scheduled for Colonoscopy on 7/28/25.     Approximate arrival time: 0700. Procedure time 0745.   *Ensure patient is aware that endoscopy team will be calling about 2 days prior to procedure date to confirm arrival time as this may change.     Facility location: [facility]; [address], [place] [address]. Check in location: 2nd Floor at Surgery desk.  *Disclaimer: Drivers are to check in with patient and stay on campus during procedure.     Sedation type: Conscious sedation  Tramadol noted in chart- pt answered no on scheduling questions. Please verify with pt if taking more than 3xs/week.     Pre op exam needed? No.    Indication for procedure: Screening       Chart review:     Electronic implanted devices? No    Recent diagnosis of diverticulitis within the last 6 weeks? No      Medication review:    Diabetic? No    Anticoagulants? No    Weight loss medication/injectable? No GLP-1 medication per patient's medication list. Nursing to verify with pre-assessment call.    Other medication HOLDING recommendations:  N/A      Prep for procedure:     Bowel prep recommendation: Standard Miralax.  If taking Tramadol, pt will need Extended prep   Due to: standard bowel prep    Procedure information and instructions sent via cassie May RN  Endoscopy Procedure Pre Assessment   [phone] option 3

## 2025-07-09 NOTE — TELEPHONE ENCOUNTER
Pre assessment completed for upcoming procedure.   (Please see previous telephone encounter notes for complete details)    I called and spoke with patient       Procedure details:    Procedure date 7/28, approximate arrival time 0700 and facility location reviewed.   Patient is aware that endoscopy team will be calling about 2 days prior to confirm arrival time.    Designated  policy reviewed and that site requests drivers to check in and stay on campus. Instructed to have someone stay 6  hours post procedure.   *Disclaimer - please notify the [facility] RN GI staff with any  issues/concerns.    Medication review:    Medications reviewed. Please see supporting documentation below. Holding recommendations discussed (if applicable).       Prep for procedure:     Procedure prep instructions reviewed.        Any additional information needed:  I spoke with patient regarding Tramadol use. He states that he has only taken a couple pills over the last several weeks. Patient ok to proceed with CS and standard Miralax prep.      Patient verbalized understanding and had no questions or concerns at this time.      Zoë Chen LPN  Endoscopy Procedure Pre Assessment   [phone] option 3

## 2025-07-21 RX ORDER — FLUMAZENIL 0.1 MG/ML
0.2 INJECTION, SOLUTION INTRAVENOUS
Status: CANCELLED | OUTPATIENT
Start: 2025-07-21 | End: 2025-07-22

## 2025-07-21 RX ORDER — PROCHLORPERAZINE MALEATE 10 MG
10 TABLET ORAL EVERY 6 HOURS PRN
Status: CANCELLED | OUTPATIENT
Start: 2025-07-21

## 2025-07-21 RX ORDER — NALOXONE HYDROCHLORIDE 0.4 MG/ML
0.2 INJECTION, SOLUTION INTRAMUSCULAR; INTRAVENOUS; SUBCUTANEOUS
Status: CANCELLED | OUTPATIENT
Start: 2025-07-21

## 2025-07-21 RX ORDER — NALOXONE HYDROCHLORIDE 0.4 MG/ML
0.4 INJECTION, SOLUTION INTRAMUSCULAR; INTRAVENOUS; SUBCUTANEOUS
Status: CANCELLED | OUTPATIENT
Start: 2025-07-21

## 2025-07-21 RX ORDER — ONDANSETRON 2 MG/ML
4 INJECTION INTRAMUSCULAR; INTRAVENOUS EVERY 6 HOURS PRN
Status: CANCELLED | OUTPATIENT
Start: 2025-07-21

## 2025-07-21 RX ORDER — ONDANSETRON 4 MG/1
4 TABLET, ORALLY DISINTEGRATING ORAL EVERY 6 HOURS PRN
Status: CANCELLED | OUTPATIENT
Start: 2025-07-21

## 2025-07-23 ENCOUNTER — PATIENT OUTREACH (OUTPATIENT)
Dept: CARE COORDINATION | Facility: CLINIC | Age: 45
End: 2025-07-23
Payer: COMMERCIAL

## 2025-07-24 ENCOUNTER — TELEPHONE (OUTPATIENT)
Dept: GASTROENTEROLOGY | Facility: CLINIC | Age: 45
End: 2025-07-24
Payer: COMMERCIAL

## 2025-07-24 NOTE — TELEPHONE ENCOUNTER
Left voicemail of arrival time of 7:00 AM.     [employer] message sent with updated arrival time.

## 2025-07-27 ENCOUNTER — ANESTHESIA EVENT (OUTPATIENT)
Dept: SURGERY | Facility: AMBULATORY SURGERY CENTER | Age: 45
End: 2025-07-27
Payer: COMMERCIAL

## 2025-07-27 ASSESSMENT — LIFESTYLE VARIABLES: TOBACCO_USE: 1

## 2025-07-27 NOTE — ANESTHESIA PREPROCEDURE EVALUATION
Anesthesia Pre-Procedure Evaluation    Patient: Steve Vo   MRN: [id number] : 1980          Procedure : Procedure(s):  Colonoscopy, Screening, High Risk         Past Medical History:   Diagnosis Date    Chondromalacia, knee, left     Tubular adenoma     10 mm      Past Surgical History:   Procedure Laterality Date    ABDOMEN SURGERY      APPENDECTOMY  2014    ARTHROSCOPY KNEE Left 2017    Med meniscectomy, DML    BIOPSY      BIOPSY OF SKIN LESION      inner lip cyst    COLONOSCOPY N/A 2017    Procedure: COMBINED COLONOSCOPY, SINGLE OR MULTIPLE BIOPSY/POLYPECTOMY BY BIOPSY;  COLONOSCOPY with biopsy and polypectomy by snare.;  Surgeon: Venkatesh Billings MD;  Location:  GI    COLONOSCOPY  2010    GENITOURINARY SURGERY      ORTHOPEDIC SURGERY  2/3/2017    VASECTOMY        Allergies   Allergen Reactions    Penicillins Rash      Social History     Tobacco Use    Smoking status: Former     Current packs/day: 0.00     Average packs/day: 1 pack/day for 15.0 years (15.0 ttl pk-yrs)     Types: Cigarettes     Start date: 3/20/1999     Quit date: 3/20/2014     Years since quittin.3     Passive exposure: Never    Smokeless tobacco: Current     Types: Chew    Tobacco comments:     <1 ppd   Substance Use Topics    Alcohol use: Yes     Comment: socially      Wt Readings from Last 1 Encounters:   25 113.4 kg (249 lb 14.4 oz)        Anesthesia Evaluation            ROS/MED HX  ENT/Pulmonary:     (+)                tobacco use, Past use,                       Neurologic:       Cardiovascular:     (+) Dyslipidemia - -   -  - -                                      METS/Exercise Tolerance:     Hematologic:       Musculoskeletal:       GI/Hepatic:       Renal/Genitourinary:       Endo:     (+)          thyroid problem, hypothyroidism,           Psychiatric/Substance Use:       Infectious Disease:       Malignancy:       Other:              Physical  "Exam  Airway  Mallampati: II  TM distance: >3 FB  Neck ROM: full  Mouth opening: >= 4 cm    Cardiovascular   Rhythm: regular  Rate: normal rate     Dental   (+) Minor Abnormalities - some fillings, tiny chips      Pulmonary - normal examBreath sounds clear to auscultation        Neurological   Other Findings       OUTSIDE LABS:  CBC:   Lab Results   Component Value Date    WBC 7.8 08/22/2024    WBC 7.0 06/29/2023    HGB 14.1 08/22/2024    HGB 14.8 06/29/2023    HCT 42.0 08/22/2024    HCT 45.3 06/29/2023     08/22/2024     06/29/2023     BMP:   Lab Results   Component Value Date     08/22/2024     12/07/2023    POTASSIUM 4.4 08/22/2024    POTASSIUM 4.7 12/07/2023    CHLORIDE 105 08/22/2024    CHLORIDE 104 12/07/2023    CO2 26 08/22/2024    CO2 27 12/07/2023    BUN 9.7 08/22/2024    BUN 12.6 12/07/2023    CR 0.95 08/22/2024    CR 0.93 12/07/2023     (H) 08/22/2024     (H) 12/07/2023     COAGS: No results found for: \"PTT\", \"INR\", \"FIBR\"  POC: No results found for: \"BGM\", \"HCG\", \"HCGS\"  HEPATIC:   Lab Results   Component Value Date    ALBUMIN 4.3 08/22/2024    PROTTOTAL 7.3 08/22/2024    ALT 30 08/22/2024    AST 22 08/22/2024    ALKPHOS 43 08/22/2024    BILITOTAL 0.3 08/22/2024     OTHER:   Lab Results   Component Value Date    A1C 5.5 08/22/2024    LISBETH 9.5 08/22/2024    MAG 1.9 08/22/2024    TSH 3.95 10/17/2024    T4 1.35 08/22/2024    SED 6 09/05/2013       Anesthesia Plan    ASA Status:  2      NPO Status: NPO Appropriate   Anesthesia Type: MAC.  Induction: intravenous.  Maintenance: TIVA.   Techniques and Equipment:       - Monitoring Plan: standard ASA monitoring     Consents    Anesthesia Plan(s) and associated risks, benefits, and realistic alternatives discussed. Questions answered and patient/representative(s) expressed understanding.     - Discussed: anesthesiologist     - Discussed with:  Patient               Postoperative Care         Comments:                   " Jarrett Foy MD    I have reviewed the pertinent notes and labs in the chart from the past 30 days and (re)examined the patient.  Any updates or changes from those notes are reflected in this note.    Clinically Significant Risk Factors Present on Admission

## 2025-07-28 ENCOUNTER — HOSPITAL ENCOUNTER (OUTPATIENT)
Facility: AMBULATORY SURGERY CENTER | Age: 45
Discharge: HOME | End: 2025-07-28
Attending: INTERNAL MEDICINE
Payer: COMMERCIAL

## 2025-07-28 ENCOUNTER — ANESTHESIA (OUTPATIENT)
Dept: SURGERY | Facility: AMBULATORY SURGERY CENTER | Age: 45
End: 2025-07-28
Payer: COMMERCIAL

## 2025-07-28 VITALS — HEART RATE: 79 BPM

## 2025-07-28 VITALS
SYSTOLIC BLOOD PRESSURE: 118 MMHG | TEMPERATURE: 96.9 F | RESPIRATION RATE: 18 BRPM | OXYGEN SATURATION: 97 % | DIASTOLIC BLOOD PRESSURE: 83 MMHG | HEART RATE: 105 BPM

## 2025-07-28 LAB — COLONOSCOPY: NORMAL

## 2025-07-28 PROCEDURE — G8907 PT DOC NO EVENTS ON DISCHARG: HCPCS

## 2025-07-28 PROCEDURE — 45378 DIAGNOSTIC COLONOSCOPY: CPT

## 2025-07-28 PROCEDURE — G8918 PT W/O PREOP ORDER IV AB PRO: HCPCS

## 2025-07-28 RX ORDER — LIDOCAINE 40 MG/G
CREAM TOPICAL
Status: DISCONTINUED | OUTPATIENT
Start: 2025-07-28 | End: 2025-07-29 | Stop reason: HOSPADM

## 2025-07-28 RX ORDER — LIDOCAINE HYDROCHLORIDE 20 MG/ML
INJECTION, SOLUTION INFILTRATION; PERINEURAL PRN
Status: DISCONTINUED | OUTPATIENT
Start: 2025-07-28 | End: 2025-07-28

## 2025-07-28 RX ORDER — SODIUM CHLORIDE, SODIUM LACTATE, POTASSIUM CHLORIDE, CALCIUM CHLORIDE 600; 310; 30; 20 MG/100ML; MG/100ML; MG/100ML; MG/100ML
INJECTION, SOLUTION INTRAVENOUS CONTINUOUS PRN
Status: DISCONTINUED | OUTPATIENT
Start: 2025-07-28 | End: 2025-07-28

## 2025-07-28 RX ORDER — PROPOFOL 10 MG/ML
INJECTION, EMULSION INTRAVENOUS CONTINUOUS PRN
Status: DISCONTINUED | OUTPATIENT
Start: 2025-07-28 | End: 2025-07-28

## 2025-07-28 RX ORDER — ONDANSETRON 2 MG/ML
4 INJECTION INTRAMUSCULAR; INTRAVENOUS
Status: DISCONTINUED | OUTPATIENT
Start: 2025-07-28 | End: 2025-07-29 | Stop reason: HOSPADM

## 2025-07-28 RX ADMIN — SODIUM CHLORIDE, SODIUM LACTATE, POTASSIUM CHLORIDE, CALCIUM CHLORIDE: 600; 310; 30; 20 INJECTION, SOLUTION INTRAVENOUS at 07:32

## 2025-07-28 RX ADMIN — PROPOFOL 150 MCG/KG/MIN: 10 INJECTION, EMULSION INTRAVENOUS at 07:34

## 2025-07-28 RX ADMIN — PROPOFOL 100 MG: 10 INJECTION, EMULSION INTRAVENOUS at 07:36

## 2025-07-28 RX ADMIN — LIDOCAINE HYDROCHLORIDE 80 MG: 20 INJECTION, SOLUTION INFILTRATION; PERINEURAL at 07:34

## 2025-07-28 NOTE — ANESTHESIA CARE TRANSFER NOTE
Patient: Steve Vo    Procedure: Procedure(s):  Colonoscopy, Screening, High Risk       Diagnosis: Screen for colon cancer [Z12.11]  Diagnosis Additional Information: No value filed.    Anesthesia Type:   MAC     Note:    Oropharynx: oropharynx clear of all foreign objects and spontaneously breathing  Level of Consciousness: drowsy  Oxygen Supplementation: room air    Independent Airway: airway patency satisfactory and stable  Dentition: dentition unchanged  Vital Signs Stable: post-procedure vital signs reviewed and stable  Report to RN Given: handoff report given  Patient transferred to: Phase II    Handoff Report: Identifed the Patient, Identified the Reponsible Provider, Reviewed the pertinent medical history, Discussed the surgical course, Reviewed Intra-OP anesthesia mangement and issues during anesthesia, Set expectations for post-procedure period and Allowed opportunity for questions and acknowledgement of understanding      Vitals:  Vitals Value Taken Time   BP     Temp     Pulse 79 07/28/25 07:50   Resp     SpO2         Electronically Signed By: QUIN Deleon CRNA  July 28, 2025  7:55 AM

## 2025-07-28 NOTE — H&P
[facility], [place] Anesthesia Pre-op History and Physical    Steve Vo MRN# [id number]   Age: 44 year old YOB: 1980            Date of Exam 7/28/2025       Primary care provider: Jaquan Morse         Chief Complaint and/or Reason for Procedure:     History of advanced adenoma         Active problem list:     Patient Active Problem List    Diagnosis Date Noted    Chronic neck pain 03/18/2025     Priority: Medium    Lumbar radiculopathy 03/18/2025     Priority: Medium    Cervical radiculopathy 03/18/2025     Priority: Medium    Adenomatous colon polyp 02/11/2025     Priority: Medium    Abdominal pain, generalized 06/29/2023     Priority: Medium    Dyslipidemia (high LDL; low HDL) 09/14/2020     Priority: Medium    S/P arthroscopy of left knee 02/09/2017     Priority: Medium    Snoring 01/30/2017     Priority: Medium    CARDIOVASCULAR SCREENING; LDL GOAL LESS THAN 160 02/21/2013     Priority: Medium            Medications (include herbals and vitamins):   Any Plavix use in the last 7 days? No     Current Outpatient Medications   Medication Sig Dispense Refill    Cetirizine HCl (ZYRTEC PO)       levothyroxine (SYNTHROID/LEVOTHROID) 75 MCG tablet Take 1 tablet (75 mcg) by mouth daily. 90 tablet 3    meloxicam (MOBIC) 15 MG tablet TAKE 1 TABLET (15 MG) BY MOUTH DAILY AS NEEDED FOR MODERATE PAIN 30 tablet 1     Current Facility-Administered Medications   Medication Dose Route Frequency Provider Last Rate Last Admin    lidocaine (LMX4) kit   Topical Q1H PRN Cherelle Jaramillo DO        lidocaine 1 % 0.1-1 mL  0.1-1 mL Other Q1H PRN Cherelle Jaramillo DO        ondansetron (ZOFRAN) injection 4 mg  4 mg Intravenous Once PRN Cherelle Jaramillo DO        sodium chloride (PF) 0.9% PF flush 3 mL  3 mL Intracatheter Q8H [facility] Cherelle Jaramillo DO        sodium chloride (PF) 0.9% PF flush 3 mL  3 mL Intracatheter q1 min prn Cherelle Jaramillo DO                 Allergies:      Allergies    Allergen Reactions    Penicillins Rash     Allergy to Latex? No  Allergy to tape?   No  Intolerances:             Physical Exam:   All vitals have been reviewed  Patient Vitals for the past 8 hrs:   BP Temp Temp src Pulse Resp SpO2   07/28/25 0718 121/77 96.8  F (36  C) Temporal 105 14 97 %     No intake/output data recorded.  Lungs:   no increased work of breathing     Cardiovascular:   RRR             Lab / Radiology Results:            Anesthetic risk and/or ASA classification:   2    Cherelle Jaramillo,

## 2025-07-28 NOTE — ANESTHESIA POSTPROCEDURE EVALUATION
Patient: Steve Vo    Procedure: Procedure(s):  Colonoscopy, Screening, High Risk       Anesthesia Type:  MAC    Note:  Disposition: Outpatient   Postop Pain Control: Uneventful            Sign Out: Well controlled pain   PONV: No   Neuro/Psych: Uneventful            Sign Out: Acceptable/Baseline neuro status   Airway/Respiratory: Uneventful            Sign Out: Acceptable/Baseline resp. status   CV/Hemodynamics: Uneventful            Sign Out: Acceptable CV status; No obvious hypovolemia; No obvious fluid overload   Other NRE: NONE   DID A NON-ROUTINE EVENT OCCUR?            Last vitals:  Vitals Value Taken Time   /83 07/28/25 08:18   Temp 96.9  F (36.1  C) 07/28/25 08:03   Pulse     Resp 18 07/28/25 08:18   SpO2 97 % 07/28/25 08:18       Electronically Signed By: Jarrett Foy MD  July 28, 2025  11:23 AM

## 2025-08-06 ENCOUNTER — PATIENT OUTREACH (OUTPATIENT)
Dept: CARE COORDINATION | Facility: CLINIC | Age: 45
End: 2025-08-06
Payer: COMMERCIAL

## (undated) DEVICE — DRSG STERI STRIP 1/2X4" R1547

## (undated) DEVICE — PAD CHUX UNDERPAD 23X24" 7136

## (undated) DEVICE — TUBING IRRIG TUR Y TYPE 96" LF 6543-01

## (undated) DEVICE — PACK ARTHROSCOPY KNEE SOP15AKFSM

## (undated) DEVICE — BNDG ELASTIC 6"X5YDS UNSTERILE 6611-60

## (undated) DEVICE — LIFTER SURGICAL ASCENDO SUBMUCOSAL LIFT AGENT BX00712934

## (undated) DEVICE — PREP CHLORAPREP 26ML TINTED ORANGE  260815

## (undated) DEVICE — GLOVE PROTEXIS POWDER FREE 7.5 ORTHOPEDIC 2D73ET75

## (undated) DEVICE — KIT ENDO FIRST STEP DISINFECTANT 200ML W/POUCH EP-4

## (undated) DEVICE — BLADE KNIFE SURG 11 371111

## (undated) RX ORDER — SIMETHICONE 40MG/0.6ML
SUSPENSION, DROPS(FINAL DOSAGE FORM)(ML) ORAL
Status: DISPENSED
Start: 2020-09-24

## (undated) RX ORDER — FENTANYL CITRATE 50 UG/ML
INJECTION, SOLUTION INTRAMUSCULAR; INTRAVENOUS
Status: DISPENSED
Start: 2017-06-12

## (undated) RX ORDER — FENTANYL CITRATE 50 UG/ML
INJECTION, SOLUTION INTRAMUSCULAR; INTRAVENOUS
Status: DISPENSED
Start: 2020-09-24